# Patient Record
Sex: FEMALE | Race: ASIAN | Employment: FULL TIME | URBAN - METROPOLITAN AREA
[De-identification: names, ages, dates, MRNs, and addresses within clinical notes are randomized per-mention and may not be internally consistent; named-entity substitution may affect disease eponyms.]

---

## 2020-05-18 ENCOUNTER — OFFICE VISIT (OUTPATIENT)
Dept: INTERNAL MEDICINE CLINIC | Facility: CLINIC | Age: 55
End: 2020-05-18
Payer: COMMERCIAL

## 2020-05-18 VITALS
SYSTOLIC BLOOD PRESSURE: 150 MMHG | WEIGHT: 168 LBS | BODY MASS INDEX: 35.26 KG/M2 | OXYGEN SATURATION: 98 % | RESPIRATION RATE: 16 BRPM | DIASTOLIC BLOOD PRESSURE: 90 MMHG | HEIGHT: 58 IN | TEMPERATURE: 99 F | HEART RATE: 92 BPM

## 2020-05-18 DIAGNOSIS — R73.09 ELEVATED GLUCOSE: ICD-10-CM

## 2020-05-18 DIAGNOSIS — Z12.11 COLON CANCER SCREENING: ICD-10-CM

## 2020-05-18 DIAGNOSIS — Z13.29 THYROID DISORDER SCREEN: ICD-10-CM

## 2020-05-18 DIAGNOSIS — IMO0001 PAP SMEAR OF CERVIX REMINDER NOT NEEDED FOREVER: ICD-10-CM

## 2020-05-18 DIAGNOSIS — Z13.89 SCREENING FOR GENITOURINARY CONDITION: ICD-10-CM

## 2020-05-18 DIAGNOSIS — Z13.220 LIPID SCREENING: ICD-10-CM

## 2020-05-18 DIAGNOSIS — I10 ESSENTIAL HYPERTENSION: ICD-10-CM

## 2020-05-18 DIAGNOSIS — R07.89 ATYPICAL CHEST PAIN: ICD-10-CM

## 2020-05-18 DIAGNOSIS — Z12.31 ENCOUNTER FOR SCREENING MAMMOGRAM FOR MALIGNANT NEOPLASM OF BREAST: ICD-10-CM

## 2020-05-18 DIAGNOSIS — Z13.0 SCREENING, IRON DEFICIENCY ANEMIA: ICD-10-CM

## 2020-05-18 DIAGNOSIS — Z00.00 LABORATORY EXAMINATION ORDERED AS PART OF A ROUTINE GENERAL MEDICAL EXAMINATION: ICD-10-CM

## 2020-05-18 DIAGNOSIS — Z00.00 ANNUAL PHYSICAL EXAM: Primary | ICD-10-CM

## 2020-05-18 PROBLEM — F17.200 TOBACCO DEPENDENCE: Status: ACTIVE | Noted: 2020-05-18

## 2020-05-18 PROCEDURE — 99203 OFFICE O/P NEW LOW 30 MIN: CPT | Performed by: INTERNAL MEDICINE

## 2020-05-18 PROCEDURE — 93000 ELECTROCARDIOGRAM COMPLETE: CPT | Performed by: INTERNAL MEDICINE

## 2020-05-18 PROCEDURE — 99386 PREV VISIT NEW AGE 40-64: CPT | Performed by: INTERNAL MEDICINE

## 2020-05-18 RX ORDER — LOSARTAN POTASSIUM 50 MG/1
50 TABLET ORAL DAILY
Qty: 90 TABLET | Refills: 0 | Status: SHIPPED | OUTPATIENT
Start: 2020-05-18 | End: 2020-09-21

## 2020-05-18 RX ORDER — ASCORBIC ACID 500 MG
500 TABLET ORAL DAILY
COMMUNITY

## 2020-05-18 NOTE — PROGRESS NOTES
HPI:    Patient ID: Shruthi Cox is a 48year old female.     HPI  HPI:   Shruthi Cox is a 48year old female who presents for a complete physical exam. Symptoms: denies discharge, itching, burning or dysuria, is menopausal. Patient complains of el or double vision  HEENT: denies nasal congestion, sinus pain or ST  LUNGS: denies shortness of breath with exertion  CARDIOVASCULAR: denies chest pain on exertion  GI: denies abdominal pain,denies heartburn  : denies dysuria, vaginal discharge or itching understanding of these issues and agrees to the plan.   The patient is asked to return fin 2 weeks for BP check    Review of Systems         Current Outpatient Medications   Medication Sig Dispense Refill   • aspirin 81 MG Oral Tab Take 81 mg by mouth daily

## 2020-05-19 ENCOUNTER — TELEPHONE (OUTPATIENT)
Dept: INTERNAL MEDICINE CLINIC | Facility: CLINIC | Age: 55
End: 2020-05-19

## 2020-05-19 ENCOUNTER — HOSPITAL ENCOUNTER (OUTPATIENT)
Dept: MAMMOGRAPHY | Age: 55
Discharge: HOME | End: 2020-05-19
Attending: INTERNAL MEDICINE
Payer: COMMERCIAL

## 2020-05-19 ENCOUNTER — APPOINTMENT (OUTPATIENT)
Dept: LAB | Facility: HOSPITAL | Age: 55
End: 2020-05-19
Attending: INTERNAL MEDICINE
Payer: COMMERCIAL

## 2020-05-19 ENCOUNTER — LABORATORY ENCOUNTER (OUTPATIENT)
Dept: LAB | Age: 55
End: 2020-05-19
Attending: INTERNAL MEDICINE
Payer: COMMERCIAL

## 2020-05-19 DIAGNOSIS — Z13.0 SCREENING, IRON DEFICIENCY ANEMIA: ICD-10-CM

## 2020-05-19 DIAGNOSIS — Z13.29 THYROID DISORDER SCREEN: ICD-10-CM

## 2020-05-19 DIAGNOSIS — E78.2 ELEVATED TRIGLYCERIDES WITH HIGH CHOLESTEROL: ICD-10-CM

## 2020-05-19 DIAGNOSIS — Z12.31 ENCOUNTER FOR SCREENING MAMMOGRAM FOR MALIGNANT NEOPLASM OF BREAST: ICD-10-CM

## 2020-05-19 DIAGNOSIS — Z00.00 LABORATORY EXAMINATION ORDERED AS PART OF A ROUTINE GENERAL MEDICAL EXAMINATION: ICD-10-CM

## 2020-05-19 DIAGNOSIS — Z13.220 LIPID SCREENING: ICD-10-CM

## 2020-05-19 DIAGNOSIS — E78.2 ELEVATED TRIGLYCERIDES WITH HIGH CHOLESTEROL: Primary | ICD-10-CM

## 2020-05-19 PROCEDURE — 80050 GENERAL HEALTH PANEL: CPT | Performed by: INTERNAL MEDICINE

## 2020-05-19 PROCEDURE — 83721 ASSAY OF BLOOD LIPOPROTEIN: CPT | Performed by: INTERNAL MEDICINE

## 2020-05-19 PROCEDURE — 83690 ASSAY OF LIPASE: CPT | Performed by: INTERNAL MEDICINE

## 2020-05-19 PROCEDURE — 36415 COLL VENOUS BLD VENIPUNCTURE: CPT | Performed by: INTERNAL MEDICINE

## 2020-05-19 PROCEDURE — 77063 BREAST TOMOSYNTHESIS BI: CPT | Performed by: INTERNAL MEDICINE

## 2020-05-19 PROCEDURE — 77067 SCR MAMMO BI INCL CAD: CPT | Performed by: INTERNAL MEDICINE

## 2020-05-19 PROCEDURE — 83036 HEMOGLOBIN GLYCOSYLATED A1C: CPT | Performed by: INTERNAL MEDICINE

## 2020-05-19 PROCEDURE — 80061 LIPID PANEL: CPT | Performed by: INTERNAL MEDICINE

## 2020-05-19 PROCEDURE — 82150 ASSAY OF AMYLASE: CPT | Performed by: INTERNAL MEDICINE

## 2020-05-19 PROCEDURE — 84439 ASSAY OF FREE THYROXINE: CPT | Performed by: INTERNAL MEDICINE

## 2020-05-19 RX ORDER — LEVOTHYROXINE SODIUM 0.05 MG/1
50 TABLET ORAL
Qty: 30 TABLET | Refills: 0 | Status: SHIPPED | OUTPATIENT
Start: 2020-05-19 | End: 2020-06-15

## 2020-05-19 RX ORDER — GLIMEPIRIDE 2 MG/1
2 TABLET ORAL 2 TIMES DAILY WITH MEALS
Qty: 60 TABLET | Refills: 0 | Status: SHIPPED | OUTPATIENT
Start: 2020-05-19 | End: 2020-06-15

## 2020-05-19 RX ORDER — ICOSAPENT ETHYL 1000 MG/1
2 CAPSULE ORAL DAILY
Qty: 60 CAPSULE | Refills: 0 | Status: SHIPPED | OUTPATIENT
Start: 2020-05-19 | End: 2020-06-01

## 2020-05-19 RX ORDER — ROSUVASTATIN CALCIUM 20 MG/1
20 TABLET, COATED ORAL DAILY
Qty: 30 TABLET | Refills: 0 | Status: SHIPPED | OUTPATIENT
Start: 2020-05-19 | End: 2020-06-15

## 2020-05-19 NOTE — TELEPHONE ENCOUNTER
Spoke with patient and discussed recommendations, pharmacy confirmed, and understanding was expressed. Orders placed.

## 2020-05-19 NOTE — TELEPHONE ENCOUNTER
----- Message from Marilynn Sewell MD sent at 5/19/2020  4:06 PM CDT -----  Review at follow up    [address] [facility],[address] PT ON MEDS    AMARYL 2 MG BID WITH MEALS  FARXIGA 5 MG QAM  CRESTOR 20 MG DAILY  VESCEPA 2 GRAMS BID DUE TO ELEVATED TRIGS WHICH P

## 2020-05-21 ENCOUNTER — TELEPHONE (OUTPATIENT)
Dept: INTERNAL MEDICINE CLINIC | Facility: CLINIC | Age: 55
End: 2020-05-21

## 2020-05-21 ENCOUNTER — APPOINTMENT (OUTPATIENT)
Dept: LAB | Age: 55
End: 2020-05-21
Attending: INTERNAL MEDICINE
Payer: COMMERCIAL

## 2020-05-21 DIAGNOSIS — Z12.11 COLON CANCER SCREENING: ICD-10-CM

## 2020-05-21 DIAGNOSIS — Z13.89 SCREENING FOR GENITOURINARY CONDITION: ICD-10-CM

## 2020-05-21 DIAGNOSIS — R73.09 ELEVATED GLUCOSE: ICD-10-CM

## 2020-05-21 PROCEDURE — 82274 ASSAY TEST FOR BLOOD FECAL: CPT | Performed by: INTERNAL MEDICINE

## 2020-05-21 PROCEDURE — 82570 ASSAY OF URINE CREATININE: CPT | Performed by: INTERNAL MEDICINE

## 2020-05-21 PROCEDURE — 81001 URINALYSIS AUTO W/SCOPE: CPT | Performed by: INTERNAL MEDICINE

## 2020-05-21 PROCEDURE — 82043 UR ALBUMIN QUANTITATIVE: CPT | Performed by: INTERNAL MEDICINE

## 2020-05-22 NOTE — TELEPHONE ENCOUNTER
Appeal form filled out with supporting documentation. Chart notes and lab results provided.   Forms faxed  Awaiting decision

## 2020-05-29 PROBLEM — E78.1 HYPERTRIGLYCERIDEMIA: Status: ACTIVE | Noted: 2020-05-29

## 2020-06-01 ENCOUNTER — TELEPHONE (OUTPATIENT)
Dept: INTERNAL MEDICINE CLINIC | Facility: CLINIC | Age: 55
End: 2020-06-01

## 2020-06-01 ENCOUNTER — OFFICE VISIT (OUTPATIENT)
Dept: INTERNAL MEDICINE CLINIC | Facility: CLINIC | Age: 55
End: 2020-06-01
Payer: COMMERCIAL

## 2020-06-01 VITALS
WEIGHT: 166 LBS | DIASTOLIC BLOOD PRESSURE: 80 MMHG | HEART RATE: 84 BPM | RESPIRATION RATE: 16 BRPM | TEMPERATURE: 99 F | OXYGEN SATURATION: 97 % | SYSTOLIC BLOOD PRESSURE: 150 MMHG | HEIGHT: 58 IN | BODY MASS INDEX: 34.85 KG/M2

## 2020-06-01 DIAGNOSIS — F17.200 TOBACCO DEPENDENCE: ICD-10-CM

## 2020-06-01 DIAGNOSIS — I10 ESSENTIAL HYPERTENSION: ICD-10-CM

## 2020-06-01 DIAGNOSIS — E78.1 HYPERTRIGLYCERIDEMIA: ICD-10-CM

## 2020-06-01 DIAGNOSIS — E11.29 DIABETES MELLITUS WITH PROTEINURIA (HCC): Primary | ICD-10-CM

## 2020-06-01 DIAGNOSIS — R80.9 DIABETES MELLITUS WITH PROTEINURIA (HCC): Primary | ICD-10-CM

## 2020-06-01 PROCEDURE — 99214 OFFICE O/P EST MOD 30 MIN: CPT | Performed by: INTERNAL MEDICINE

## 2020-06-01 RX ORDER — GEMFIBROZIL 600 MG/1
600 TABLET, FILM COATED ORAL
Qty: 180 TABLET | Refills: 1 | Status: SHIPPED | OUTPATIENT
Start: 2020-06-01 | End: 2020-12-28

## 2020-06-01 RX ORDER — AMLODIPINE BESYLATE 5 MG/1
5 TABLET ORAL DAILY
Qty: 90 TABLET | Refills: 0 | Status: SHIPPED | OUTPATIENT
Start: 2020-06-01 | End: 2020-09-02

## 2020-06-01 NOTE — TELEPHONE ENCOUNTER
[facility] pharmacy called to make sure Ximena Kearney was aware of the possible drug interaction between gemfibrozil (given today) and rosuvastatin (given 5/19). Please call pharmacy back at #[phone].

## 2020-06-01 NOTE — TELEPHONE ENCOUNTER
rosuvastatin + gemfibrozil   avoid combo: combo may incr.  rosuvastatin levels, risk of myopathy, rhabdomyolysis, other adverse effects w/o additional cardiovascular benefit (OATP-mediated transport inhibited, additive effects)     Dr. Barbara Howard is aware ok to d

## 2020-06-01 NOTE — PROGRESS NOTES
HPI:    Patient ID: Monie Schuler is a 48year old female.     HTN       HPI:   Monie Schuler is a 48year old female who presents for recheck of her diabetes, htn and hyperlipidmeia Patient’s FBS have been improved since start of metformin and fa Propanediol (FARXIGA) 5 MG Oral Tab Take 5 mg by mouth every morning. 30 tablet 0   • Rosuvastatin Calcium 20 MG Oral Tab Take 1 tablet (20 mg total) by mouth daily.  30 tablet 0   • Levothyroxine Sodium 50 MCG Oral Tab Take 1 tablet (50 mcg total) by mouth Add norvasc 5 mg daily and recheck bp in 2 weeks  hyperlipidemia cont statin. Add gemfibozil.  Pt asked to monitor for myalgias    Recommendations are: continue present meds, check HgbA1C in 3 m, check fasting lipids and CMP in 3 m, lose wgt with carbohydra (600 mg total) by mouth 2 (two) times daily before meals.        Imaging & Referrals:  None       [id number]

## 2020-06-09 ENCOUNTER — MED REC SCAN ONLY (OUTPATIENT)
Dept: INTERNAL MEDICINE CLINIC | Facility: CLINIC | Age: 55
End: 2020-06-09

## 2020-06-15 ENCOUNTER — OFFICE VISIT (OUTPATIENT)
Dept: INTERNAL MEDICINE CLINIC | Facility: CLINIC | Age: 55
End: 2020-06-15
Payer: COMMERCIAL

## 2020-06-15 VITALS
HEART RATE: 90 BPM | OXYGEN SATURATION: 97 % | HEIGHT: 58 IN | DIASTOLIC BLOOD PRESSURE: 68 MMHG | TEMPERATURE: 98 F | RESPIRATION RATE: 16 BRPM | SYSTOLIC BLOOD PRESSURE: 116 MMHG | BODY MASS INDEX: 35.26 KG/M2 | WEIGHT: 168 LBS

## 2020-06-15 DIAGNOSIS — E03.4 HYPOTHYROIDISM DUE TO ACQUIRED ATROPHY OF THYROID: ICD-10-CM

## 2020-06-15 DIAGNOSIS — R80.9 DIABETES MELLITUS WITH PROTEINURIA (HCC): Primary | ICD-10-CM

## 2020-06-15 DIAGNOSIS — E11.29 DIABETES MELLITUS WITH PROTEINURIA (HCC): Primary | ICD-10-CM

## 2020-06-15 DIAGNOSIS — I10 ESSENTIAL HYPERTENSION: ICD-10-CM

## 2020-06-15 DIAGNOSIS — I10 ESSENTIAL HYPERTENSION: Primary | ICD-10-CM

## 2020-06-15 DIAGNOSIS — E78.1 HYPERTRIGLYCERIDEMIA: ICD-10-CM

## 2020-06-15 PROCEDURE — 99213 OFFICE O/P EST LOW 20 MIN: CPT | Performed by: INTERNAL MEDICINE

## 2020-06-15 RX ORDER — ROSUVASTATIN CALCIUM 20 MG/1
20 TABLET, COATED ORAL DAILY
Qty: 30 TABLET | Refills: 5 | Status: SHIPPED | OUTPATIENT
Start: 2020-06-15 | End: 2021-02-05

## 2020-06-15 RX ORDER — GLIMEPIRIDE 2 MG/1
2 TABLET ORAL 2 TIMES DAILY WITH MEALS
Qty: 60 TABLET | Refills: 2 | Status: SHIPPED | OUTPATIENT
Start: 2020-06-15 | End: 2020-08-04

## 2020-06-15 RX ORDER — LEVOTHYROXINE SODIUM 0.05 MG/1
50 TABLET ORAL
Qty: 30 TABLET | Refills: 0 | Status: SHIPPED | OUTPATIENT
Start: 2020-06-15 | End: 2020-07-14

## 2020-06-15 NOTE — TELEPHONE ENCOUNTER
Patient called and requested refills on Farxiga, Synthroid, Rosuvastatin, Glimepiride. Please send all a month refill to [facility] on file.

## 2020-06-15 NOTE — PROGRESS NOTES
HPI:    Patient ID: Kevin Land is a 48year old female. HPI  Kevin Land is a 48year old female. HPI:   Patient presents for recheck of her hypertension.  Pt has been taking medications as instructed, no medication side effects, home mouth daily. • losartan Potassium 50 MG Oral Tab Take 1 tablet (50 mg total) by mouth daily. 90 tablet 0      No past medical history on file. No past surgical history on file.    Social History:    Social History    Tobacco Use      Smoking status: L 0   • Dapagliflozin Propanediol (FARXIGA) 5 MG Oral Tab Take 5 mg by mouth every morning. 30 tablet 0   • Rosuvastatin Calcium 20 MG Oral Tab Take 1 tablet (20 mg total) by mouth daily.  30 tablet 0   • Levothyroxine Sodium 50 MCG Oral Tab Take 1 tablet (50

## 2020-07-14 DIAGNOSIS — E03.4 HYPOTHYROIDISM DUE TO ACQUIRED ATROPHY OF THYROID: ICD-10-CM

## 2020-07-14 RX ORDER — LEVOTHYROXINE SODIUM 0.05 MG/1
TABLET ORAL
Qty: 30 TABLET | Refills: 0 | Status: SHIPPED | OUTPATIENT
Start: 2020-07-14 | End: 2020-08-24

## 2020-08-03 ENCOUNTER — OFFICE VISIT (OUTPATIENT)
Dept: INTERNAL MEDICINE CLINIC | Facility: CLINIC | Age: 55
End: 2020-08-03
Payer: COMMERCIAL

## 2020-08-03 VITALS
HEART RATE: 82 BPM | HEIGHT: 58 IN | BODY MASS INDEX: 35.05 KG/M2 | RESPIRATION RATE: 16 BRPM | DIASTOLIC BLOOD PRESSURE: 68 MMHG | TEMPERATURE: 98 F | SYSTOLIC BLOOD PRESSURE: 128 MMHG | OXYGEN SATURATION: 98 % | WEIGHT: 167 LBS

## 2020-08-03 DIAGNOSIS — I10 ESSENTIAL HYPERTENSION: ICD-10-CM

## 2020-08-03 DIAGNOSIS — E11.29 DIABETES MELLITUS WITH PROTEINURIA (HCC): Primary | ICD-10-CM

## 2020-08-03 DIAGNOSIS — R80.9 DIABETES MELLITUS WITH PROTEINURIA (HCC): Primary | ICD-10-CM

## 2020-08-03 PROBLEM — Z23: Status: ACTIVE | Noted: 2020-08-03

## 2020-08-03 PROBLEM — Z28.21 PNEUMOCOCCAL VACCINATION DECLINED BY PATIENT: Status: ACTIVE | Noted: 2020-08-03

## 2020-08-03 PROBLEM — Z23: Status: RESOLVED | Noted: 2020-08-03 | Resolved: 2020-08-03

## 2020-08-03 PROCEDURE — 3074F SYST BP LT 130 MM HG: CPT | Performed by: INTERNAL MEDICINE

## 2020-08-03 PROCEDURE — 99214 OFFICE O/P EST MOD 30 MIN: CPT | Performed by: INTERNAL MEDICINE

## 2020-08-03 PROCEDURE — 3078F DIAST BP <80 MM HG: CPT | Performed by: INTERNAL MEDICINE

## 2020-08-03 PROCEDURE — 3008F BODY MASS INDEX DOCD: CPT | Performed by: INTERNAL MEDICINE

## 2020-08-03 NOTE — PROGRESS NOTES
HPI:    Patient ID: Hanna Walker is a 48year old female. Hypertension     Diabetes       HPI:   Hanna Walker is a 48year old female who presents for recheck of her diabetes and hypertension. Patient’s FBS have been improved.  No hypoglycemi times daily with meals. 60 tablet 2   • amLODIPine Besylate 5 MG Oral Tab Take 1 tablet (5 mg total) by mouth daily. 90 tablet 0   • gemfibrozil 600 MG Oral Tab Take 1 tablet (600 mg total) by mouth 2 (two) times daily before meals.  180 tablet 1   • aspiri plan  Pt declines pneumovax. Recommendations are: continue present meds, check HgbA1C, check fasting lipids and CMP, lose wgt with carbohydrate controlled diet and exercise, check feet daily.   The patient indicates understanding of these issues and agrees

## 2020-08-03 NOTE — PATIENT INSTRUCTIONS
Diabetes: Activity Tips    Being more active can help you manage your diabetes. The tips on this sheet can help you get the most from your exercise. They can also help you stay safe.    Staying active   It’s important for adults to spend less time sitting You may be told to plan your exercise for 1 to 2 hours after a meal. In most cases, you don’t need to eat while being active. Test your blood sugar before exercising if you take insulin or medicine that can cause low blood sugar.  And carry a fast-acting mensah

## 2020-08-04 ENCOUNTER — TELEPHONE (OUTPATIENT)
Dept: INTERNAL MEDICINE CLINIC | Facility: CLINIC | Age: 55
End: 2020-08-04

## 2020-08-04 ENCOUNTER — APPOINTMENT (OUTPATIENT)
Dept: LAB | Age: 55
End: 2020-08-04
Attending: INTERNAL MEDICINE
Payer: COMMERCIAL

## 2020-08-04 DIAGNOSIS — R80.9 DIABETES MELLITUS WITH PROTEINURIA (HCC): ICD-10-CM

## 2020-08-04 DIAGNOSIS — E11.29 DIABETES MELLITUS WITH PROTEINURIA (HCC): ICD-10-CM

## 2020-08-04 DIAGNOSIS — I10 ESSENTIAL HYPERTENSION: ICD-10-CM

## 2020-08-04 LAB
ALBUMIN SERPL-MCNC: 3.9 G/DL (ref 3.4–5)
ALBUMIN/GLOB SERPL: 1 {RATIO} (ref 1–2)
ALP LIVER SERPL-CCNC: 69 U/L (ref 41–108)
ALT SERPL-CCNC: 57 U/L (ref 13–56)
ANION GAP SERPL CALC-SCNC: 4 MMOL/L (ref 0–18)
AST SERPL-CCNC: 23 U/L (ref 15–37)
BILIRUB SERPL-MCNC: 0.3 MG/DL (ref 0.1–2)
BUN BLD-MCNC: 16 MG/DL (ref 7–18)
BUN/CREAT SERPL: 17.6 (ref 10–20)
CALCIUM BLD-MCNC: 9.4 MG/DL (ref 8.5–10.1)
CHLORIDE SERPL-SCNC: 108 MMOL/L (ref 98–112)
CHOLEST SMN-MCNC: 191 MG/DL (ref ?–200)
CO2 SERPL-SCNC: 27 MMOL/L (ref 21–32)
CREAT BLD-MCNC: 0.91 MG/DL (ref 0.55–1.02)
EST. AVERAGE GLUCOSE BLD GHB EST-MCNC: 212 MG/DL (ref 68–126)
GLOBULIN PLAS-MCNC: 3.9 G/DL (ref 2.8–4.4)
GLUCOSE BLD-MCNC: 152 MG/DL (ref 70–99)
HBA1C MFR BLD HPLC: 9 % (ref ?–5.7)
HDLC SERPL-MCNC: 44 MG/DL (ref 40–59)
LDLC SERPL CALC-MCNC: 101 MG/DL (ref ?–100)
M PROTEIN MFR SERPL ELPH: 7.8 G/DL (ref 6.4–8.2)
NONHDLC SERPL-MCNC: 147 MG/DL (ref ?–130)
OSMOLALITY SERPL CALC.SUM OF ELEC: 292 MOSM/KG (ref 275–295)
PATIENT FASTING Y/N/NP: YES
PATIENT FASTING Y/N/NP: YES
POTASSIUM SERPL-SCNC: 4 MMOL/L (ref 3.5–5.1)
SODIUM SERPL-SCNC: 139 MMOL/L (ref 136–145)
TRIGL SERPL-MCNC: 232 MG/DL (ref 30–149)
VLDLC SERPL CALC-MCNC: 46 MG/DL (ref 0–30)

## 2020-08-04 PROCEDURE — 80061 LIPID PANEL: CPT | Performed by: INTERNAL MEDICINE

## 2020-08-04 PROCEDURE — 36415 COLL VENOUS BLD VENIPUNCTURE: CPT | Performed by: INTERNAL MEDICINE

## 2020-08-04 PROCEDURE — 80053 COMPREHEN METABOLIC PANEL: CPT | Performed by: INTERNAL MEDICINE

## 2020-08-04 PROCEDURE — 83036 HEMOGLOBIN GLYCOSYLATED A1C: CPT | Performed by: INTERNAL MEDICINE

## 2020-08-04 RX ORDER — GLIMEPIRIDE 2 MG/1
2 TABLET ORAL 2 TIMES DAILY WITH MEALS
Qty: 60 TABLET | Refills: 2 | Status: SHIPPED | OUTPATIENT
Start: 2020-08-04 | End: 2021-01-26

## 2020-08-04 NOTE — TELEPHONE ENCOUNTER
----- Message from Guillermina Pena MD sent at 8/4/2020  3:31 PM CDT -----  Cont statin.  flp is at goal. Cont treatment for elevated trigs  Renal lft are normal

## 2020-08-04 NOTE — TELEPHONE ENCOUNTER
----- Message from Shane Mayo MD sent at 8/4/2020  4:32 PM CDT -----  dm2 improved but not at goal. Increase farxiga to 10 mg daily and recheck a1c in 3 m

## 2020-08-23 DIAGNOSIS — E03.4 HYPOTHYROIDISM DUE TO ACQUIRED ATROPHY OF THYROID: ICD-10-CM

## 2020-08-24 RX ORDER — LEVOTHYROXINE SODIUM 0.05 MG/1
TABLET ORAL
Qty: 30 TABLET | Refills: 0 | Status: SHIPPED | OUTPATIENT
Start: 2020-08-24 | End: 2020-09-21

## 2020-09-02 DIAGNOSIS — I10 ESSENTIAL HYPERTENSION: ICD-10-CM

## 2020-09-02 RX ORDER — AMLODIPINE BESYLATE 5 MG/1
TABLET ORAL
Qty: 90 TABLET | Refills: 1 | Status: SHIPPED | OUTPATIENT
Start: 2020-09-02 | End: 2021-08-03

## 2020-09-20 DIAGNOSIS — E03.4 HYPOTHYROIDISM DUE TO ACQUIRED ATROPHY OF THYROID: ICD-10-CM

## 2020-09-20 DIAGNOSIS — I10 ESSENTIAL HYPERTENSION: ICD-10-CM

## 2020-09-21 RX ORDER — LEVOTHYROXINE SODIUM 0.05 MG/1
TABLET ORAL
Qty: 30 TABLET | Refills: 0 | Status: SHIPPED | OUTPATIENT
Start: 2020-09-21 | End: 2020-11-01

## 2020-09-21 RX ORDER — LOSARTAN POTASSIUM 50 MG/1
TABLET ORAL
Qty: 90 TABLET | Refills: 0 | Status: SHIPPED | OUTPATIENT
Start: 2020-09-21 | End: 2020-12-28

## 2020-11-01 DIAGNOSIS — E03.4 HYPOTHYROIDISM DUE TO ACQUIRED ATROPHY OF THYROID: ICD-10-CM

## 2020-11-02 RX ORDER — LEVOTHYROXINE SODIUM 0.05 MG/1
TABLET ORAL
Qty: 30 TABLET | Refills: 0 | Status: SHIPPED | OUTPATIENT
Start: 2020-11-02 | End: 2020-12-09

## 2020-12-09 DIAGNOSIS — E03.4 HYPOTHYROIDISM DUE TO ACQUIRED ATROPHY OF THYROID: ICD-10-CM

## 2020-12-09 RX ORDER — LEVOTHYROXINE SODIUM 0.05 MG/1
TABLET ORAL
Qty: 30 TABLET | Refills: 5 | Status: SHIPPED | OUTPATIENT
Start: 2020-12-09 | End: 2021-08-03

## 2020-12-11 ENCOUNTER — OFFICE VISIT (OUTPATIENT)
Dept: INTERNAL MEDICINE CLINIC | Facility: CLINIC | Age: 55
End: 2020-12-11
Payer: COMMERCIAL

## 2020-12-11 VITALS
SYSTOLIC BLOOD PRESSURE: 138 MMHG | BODY MASS INDEX: 34.63 KG/M2 | OXYGEN SATURATION: 97 % | RESPIRATION RATE: 16 BRPM | TEMPERATURE: 97 F | HEART RATE: 88 BPM | HEIGHT: 58 IN | DIASTOLIC BLOOD PRESSURE: 82 MMHG | WEIGHT: 165 LBS

## 2020-12-11 DIAGNOSIS — F17.200 TOBACCO DEPENDENCE: ICD-10-CM

## 2020-12-11 DIAGNOSIS — R80.9 DIABETES MELLITUS WITH PROTEINURIA (HCC): Primary | ICD-10-CM

## 2020-12-11 DIAGNOSIS — Z28.21 PNEUMOCOCCAL VACCINATION DECLINED BY PATIENT: ICD-10-CM

## 2020-12-11 DIAGNOSIS — Z01.84 IMMUNITY STATUS TESTING: ICD-10-CM

## 2020-12-11 DIAGNOSIS — Z23 NEED FOR INFLUENZA VACCINATION: ICD-10-CM

## 2020-12-11 DIAGNOSIS — I10 ESSENTIAL HYPERTENSION: ICD-10-CM

## 2020-12-11 DIAGNOSIS — E11.29 DIABETES MELLITUS WITH PROTEINURIA (HCC): Primary | ICD-10-CM

## 2020-12-11 PROCEDURE — 3079F DIAST BP 80-89 MM HG: CPT | Performed by: INTERNAL MEDICINE

## 2020-12-11 PROCEDURE — 99214 OFFICE O/P EST MOD 30 MIN: CPT | Performed by: INTERNAL MEDICINE

## 2020-12-11 PROCEDURE — 3008F BODY MASS INDEX DOCD: CPT | Performed by: INTERNAL MEDICINE

## 2020-12-11 PROCEDURE — 90471 IMMUNIZATION ADMIN: CPT | Performed by: INTERNAL MEDICINE

## 2020-12-11 PROCEDURE — 3075F SYST BP GE 130 - 139MM HG: CPT | Performed by: INTERNAL MEDICINE

## 2020-12-11 PROCEDURE — 90686 IIV4 VACC NO PRSV 0.5 ML IM: CPT | Performed by: INTERNAL MEDICINE

## 2020-12-11 RX ORDER — DAPAGLIFLOZIN 5 MG/1
5 TABLET, FILM COATED ORAL EVERY MORNING
COMMUNITY
Start: 2020-11-22 | End: 2020-12-28

## 2020-12-11 NOTE — PROGRESS NOTES
HPI:    Patient ID: Suly Colunga is a 48year old female. Diabetes    Hypertension      HPI:   Suly Colunga is a 48year old female who presents for recheck of her diabetes and ht. Patient’s FBS have been 120's. No hypoglycemia. .  Pt has bee TABLET(50 MG) BY MOUTH DAILY 90 tablet 0   • AMLODIPINE BESYLATE 5 MG Oral Tab TAKE 1 TABLET(5 MG) BY MOUTH DAILY 90 tablet 1   • Rosuvastatin Calcium 20 MG Oral Tab Take 1 tablet (20 mg total) by mouth daily.  30 tablet 5   • gemfibrozil 600 MG Oral Tab Ta well.      ASSESSMENT AND PLAN:   Randa Vega is a 48year old female who presents for a recheck of her diabetes and htn. Diabetic control is stable.  HTN is at goal.  Recommendations are: at this time continue present meds, check HgbA1C,  fasting li prescriptions requested or ordered in this encounter       Imaging & Referrals:  FLULAVAL INFLUENZA VACCINE QUAD PRESERVATIVE FREE 0.5 ML       VI#[id number]

## 2020-12-27 DIAGNOSIS — I10 ESSENTIAL HYPERTENSION: ICD-10-CM

## 2020-12-27 DIAGNOSIS — E78.1 HYPERTRIGLYCERIDEMIA: ICD-10-CM

## 2020-12-28 RX ORDER — GEMFIBROZIL 600 MG/1
TABLET, FILM COATED ORAL
Qty: 180 TABLET | Refills: 1 | Status: SHIPPED | OUTPATIENT
Start: 2020-12-28 | End: 2021-09-17

## 2020-12-28 RX ORDER — LOSARTAN POTASSIUM 50 MG/1
TABLET ORAL
Qty: 90 TABLET | Refills: 0 | Status: SHIPPED | OUTPATIENT
Start: 2020-12-28 | End: 2021-05-14

## 2020-12-28 RX ORDER — DAPAGLIFLOZIN 5 MG/1
TABLET, FILM COATED ORAL
Qty: 30 TABLET | Refills: 0 | Status: SHIPPED | OUTPATIENT
Start: 2020-12-28 | End: 2021-01-26

## 2020-12-28 NOTE — TELEPHONE ENCOUNTER
Last time medication was refilled 6/1/2020  Quantity and number of refills 180 w/  1  Last OV 12/11/2020  Next OV 1/5/2021     Losartan  Last time medication was refilled 9/21/2020  Quantity and number of refills 90 w/ 0   Last OV 12/11/2020  Next OV 1/5/2

## 2020-12-30 ENCOUNTER — LAB ENCOUNTER (OUTPATIENT)
Dept: LAB | Age: 55
End: 2020-12-30
Attending: INTERNAL MEDICINE
Payer: COMMERCIAL

## 2020-12-30 DIAGNOSIS — I10 ESSENTIAL HYPERTENSION: ICD-10-CM

## 2020-12-30 DIAGNOSIS — Z01.84 IMMUNITY STATUS TESTING: ICD-10-CM

## 2020-12-30 DIAGNOSIS — E11.29 DIABETES MELLITUS WITH PROTEINURIA (HCC): ICD-10-CM

## 2020-12-30 DIAGNOSIS — R80.9 DIABETES MELLITUS WITH PROTEINURIA (HCC): ICD-10-CM

## 2020-12-30 LAB
ALBUMIN SERPL-MCNC: 3.8 G/DL (ref 3.4–5)
ALBUMIN/GLOB SERPL: 1.1 {RATIO} (ref 1–2)
ALP LIVER SERPL-CCNC: 89 U/L
ALT SERPL-CCNC: 24 U/L
ANION GAP SERPL CALC-SCNC: 6 MMOL/L (ref 0–18)
AST SERPL-CCNC: 18 U/L (ref 15–37)
BILIRUB SERPL-MCNC: 0.3 MG/DL (ref 0.1–2)
BUN BLD-MCNC: 15 MG/DL (ref 7–18)
BUN/CREAT SERPL: 18.8 (ref 10–20)
CALCIUM BLD-MCNC: 9.1 MG/DL (ref 8.5–10.1)
CHLORIDE SERPL-SCNC: 111 MMOL/L (ref 98–112)
CHOLEST SMN-MCNC: 202 MG/DL (ref ?–200)
CO2 SERPL-SCNC: 24 MMOL/L (ref 21–32)
CREAT BLD-MCNC: 0.8 MG/DL
EST. AVERAGE GLUCOSE BLD GHB EST-MCNC: 166 MG/DL (ref 68–126)
GLOBULIN PLAS-MCNC: 3.6 G/DL (ref 2.8–4.4)
GLUCOSE BLD-MCNC: 138 MG/DL (ref 70–99)
HBA1C MFR BLD HPLC: 7.4 % (ref ?–5.7)
HDLC SERPL-MCNC: 44 MG/DL (ref 40–59)
LDLC SERPL CALC-MCNC: 101 MG/DL (ref ?–100)
M PROTEIN MFR SERPL ELPH: 7.4 G/DL (ref 6.4–8.2)
NONHDLC SERPL-MCNC: 158 MG/DL (ref ?–130)
OSMOLALITY SERPL CALC.SUM OF ELEC: 295 MOSM/KG (ref 275–295)
PATIENT FASTING Y/N/NP: YES
PATIENT FASTING Y/N/NP: YES
POTASSIUM SERPL-SCNC: 4 MMOL/L (ref 3.5–5.1)
SODIUM SERPL-SCNC: 141 MMOL/L (ref 136–145)
TRIGL SERPL-MCNC: 286 MG/DL (ref 30–149)
VLDLC SERPL CALC-MCNC: 57 MG/DL (ref 0–30)

## 2020-12-30 PROCEDURE — 86480 TB TEST CELL IMMUN MEASURE: CPT

## 2020-12-30 PROCEDURE — 80061 LIPID PANEL: CPT

## 2020-12-30 PROCEDURE — 80053 COMPREHEN METABOLIC PANEL: CPT

## 2020-12-30 PROCEDURE — 83036 HEMOGLOBIN GLYCOSYLATED A1C: CPT

## 2020-12-30 PROCEDURE — 36415 COLL VENOUS BLD VENIPUNCTURE: CPT

## 2021-01-02 LAB
M TB IFN-G CD4+ T-CELLS BLD-ACNC: 0.04 IU/ML
M TB TUBERC IFN-G BLD QL: NEGATIVE
M TB TUBERC IGNF/MITOGEN IGNF CONTROL: 7.35 IU/ML
QUANTIFERON TB1 MINUS NIL: 0.02 IU/ML
QUANTIFERON TB2 MINUS NIL: 0 IU/ML

## 2021-01-05 ENCOUNTER — LAB ENCOUNTER (OUTPATIENT)
Dept: LAB | Age: 56
End: 2021-01-05
Attending: INTERNAL MEDICINE
Payer: COMMERCIAL

## 2021-01-05 ENCOUNTER — OFFICE VISIT (OUTPATIENT)
Dept: INTERNAL MEDICINE CLINIC | Facility: CLINIC | Age: 56
End: 2021-01-05
Payer: COMMERCIAL

## 2021-01-05 VITALS
OXYGEN SATURATION: 98 % | BODY MASS INDEX: 34.43 KG/M2 | HEART RATE: 83 BPM | WEIGHT: 164 LBS | DIASTOLIC BLOOD PRESSURE: 80 MMHG | TEMPERATURE: 98 F | RESPIRATION RATE: 16 BRPM | HEIGHT: 58 IN | SYSTOLIC BLOOD PRESSURE: 124 MMHG

## 2021-01-05 DIAGNOSIS — Z01.84 IMMUNITY STATUS TESTING: ICD-10-CM

## 2021-01-05 DIAGNOSIS — M79.604 PAIN IN BOTH LOWER EXTREMITIES: Primary | ICD-10-CM

## 2021-01-05 DIAGNOSIS — M79.605 PAIN IN BOTH LOWER EXTREMITIES: Primary | ICD-10-CM

## 2021-01-05 DIAGNOSIS — Z23 NEED FOR TDAP VACCINATION: ICD-10-CM

## 2021-01-05 DIAGNOSIS — G57.93 NEUROPATHY INVOLVING BOTH LOWER EXTREMITIES: ICD-10-CM

## 2021-01-05 LAB
RUBV IGG SER QL: POSITIVE
RUBV IGG SER-ACNC: 71.7 IU/ML (ref 10–?)

## 2021-01-05 PROCEDURE — 90715 TDAP VACCINE 7 YRS/> IM: CPT | Performed by: INTERNAL MEDICINE

## 2021-01-05 PROCEDURE — 3008F BODY MASS INDEX DOCD: CPT | Performed by: INTERNAL MEDICINE

## 2021-01-05 PROCEDURE — 86765 RUBEOLA ANTIBODY: CPT

## 2021-01-05 PROCEDURE — 36415 COLL VENOUS BLD VENIPUNCTURE: CPT

## 2021-01-05 PROCEDURE — 90471 IMMUNIZATION ADMIN: CPT | Performed by: INTERNAL MEDICINE

## 2021-01-05 PROCEDURE — 86735 MUMPS ANTIBODY: CPT

## 2021-01-05 PROCEDURE — 3074F SYST BP LT 130 MM HG: CPT | Performed by: INTERNAL MEDICINE

## 2021-01-05 PROCEDURE — 99214 OFFICE O/P EST MOD 30 MIN: CPT | Performed by: INTERNAL MEDICINE

## 2021-01-05 PROCEDURE — 86762 RUBELLA ANTIBODY: CPT

## 2021-01-05 PROCEDURE — 3079F DIAST BP 80-89 MM HG: CPT | Performed by: INTERNAL MEDICINE

## 2021-01-05 RX ORDER — GABAPENTIN 100 MG/1
100 CAPSULE ORAL 2 TIMES DAILY
Qty: 180 CAPSULE | Refills: 0 | Status: SHIPPED | OUTPATIENT
Start: 2021-01-05 | End: 2021-05-14

## 2021-01-05 NOTE — PROGRESS NOTES
HPI:    Patient ID: Geovanna Contreras is a 48year old female. Leg Pain   The pain is present in the left lower leg and right lower leg. This is a recurrent problem. The current episode started more than 1 month ago.  There has been no history of extrem • Vitamin C 500 MG Oral Tab Take 500 mg by mouth daily. • glimepiride 2 MG Oral Tab Take 1 tablet (2 mg total) by mouth 2 (two) times daily with meals.  60 tablet 2     Allergies:  Metformin               DIARRHEA   PHYSICAL EXAM:   Physical Exam   Co

## 2021-01-05 NOTE — PATIENT INSTRUCTIONS
Tips for Quitting Smoking (Cardiovascular)  Quitting smoking is a gift to yourself. It's one of the best things you can do to keep your heart disease from getting worse. Smoking reduces oxygen flow to your heart. It does this in 2 ways.  It speeds the pfeiffer ______________________________________________________________________________  ______________________________________________________________________________  Set limits  · Limit where you can smoke. Pick one room or a porch. Smoke only in that place.   ·

## 2021-01-06 LAB
MEV IGG SER-ACNC: 275 AU/ML (ref 16.5–?)
MUV IGG SER IA-ACNC: 29.8 AU/ML (ref 11–?)

## 2021-01-26 DIAGNOSIS — E11.29 DIABETES MELLITUS WITH PROTEINURIA (HCC): ICD-10-CM

## 2021-01-26 DIAGNOSIS — R80.9 DIABETES MELLITUS WITH PROTEINURIA (HCC): ICD-10-CM

## 2021-01-26 RX ORDER — BLOOD SUGAR DIAGNOSTIC
STRIP MISCELLANEOUS
Qty: 200 EACH | Refills: 1 | Status: SHIPPED | OUTPATIENT
Start: 2021-01-26

## 2021-01-26 RX ORDER — GLIMEPIRIDE 2 MG/1
2 TABLET ORAL 2 TIMES DAILY WITH MEALS
Qty: 60 TABLET | Refills: 5 | Status: SHIPPED | OUTPATIENT
Start: 2021-01-26 | End: 2021-11-30

## 2021-01-26 NOTE — TELEPHONE ENCOUNTER
Fax request from [facility] for a refill on Glimeperide 2mg tabs qty 60, Farxiga 5 mg qty 30, and Contour Next Test Strips 50s qty 200 . Fax in triage.

## 2021-01-27 DIAGNOSIS — E11.29 DIABETES MELLITUS WITH PROTEINURIA (HCC): Primary | ICD-10-CM

## 2021-01-27 DIAGNOSIS — R80.9 DIABETES MELLITUS WITH PROTEINURIA (HCC): Primary | ICD-10-CM

## 2021-01-27 RX ORDER — LANCETS
EACH MISCELLANEOUS
Qty: 200 EACH | Refills: 1 | Status: SHIPPED | OUTPATIENT
Start: 2021-01-27

## 2021-01-27 NOTE — TELEPHONE ENCOUNTER
Refill Request    [facility] [address] AT [address] [facility], [phone], [phone]    Put in Triage room

## 2021-02-05 DIAGNOSIS — R80.9 DIABETES MELLITUS WITH PROTEINURIA (HCC): ICD-10-CM

## 2021-02-05 DIAGNOSIS — E78.1 HYPERTRIGLYCERIDEMIA: ICD-10-CM

## 2021-02-05 DIAGNOSIS — E11.29 DIABETES MELLITUS WITH PROTEINURIA (HCC): ICD-10-CM

## 2021-02-05 RX ORDER — ROSUVASTATIN CALCIUM 20 MG/1
20 TABLET, COATED ORAL DAILY
Qty: 30 TABLET | Refills: 5 | Status: SHIPPED | OUTPATIENT
Start: 2021-02-05

## 2021-02-05 NOTE — TELEPHONE ENCOUNTER
Fax received from [facility] requesting refill of Rosuvastatin Calcium 20 MG Oral Tab. Placed in triage.

## 2021-03-17 DIAGNOSIS — Z23 NEED FOR VACCINATION: ICD-10-CM

## 2021-04-03 DIAGNOSIS — E03.4 HYPOTHYROIDISM DUE TO ACQUIRED ATROPHY OF THYROID: ICD-10-CM

## 2021-04-05 RX ORDER — LEVOTHYROXINE SODIUM 0.05 MG/1
50 TABLET ORAL
Qty: 30 TABLET | Refills: 5 | OUTPATIENT
Start: 2021-04-05

## 2021-04-06 DIAGNOSIS — I10 ESSENTIAL HYPERTENSION: ICD-10-CM

## 2021-04-06 DIAGNOSIS — E03.4 HYPOTHYROIDISM DUE TO ACQUIRED ATROPHY OF THYROID: ICD-10-CM

## 2021-04-06 RX ORDER — LOSARTAN POTASSIUM 50 MG/1
50 TABLET ORAL DAILY
Qty: 90 TABLET | Refills: 0 | Status: CANCELLED | OUTPATIENT
Start: 2021-04-06

## 2021-04-06 RX ORDER — LEVOTHYROXINE SODIUM 0.05 MG/1
50 TABLET ORAL
Qty: 30 TABLET | Refills: 5 | OUTPATIENT
Start: 2021-04-06

## 2021-04-08 ENCOUNTER — TELEPHONE (OUTPATIENT)
Dept: INTERNAL MEDICINE CLINIC | Facility: CLINIC | Age: 56
End: 2021-04-08

## 2021-04-08 NOTE — TELEPHONE ENCOUNTER
Patient was sent a my chart message informing her that she still had 2 refills of levothyroxine on file at [facility] with any questions.

## 2021-04-12 ENCOUNTER — TELEPHONE (OUTPATIENT)
Dept: INTERNAL MEDICINE CLINIC | Facility: CLINIC | Age: 56
End: 2021-04-12

## 2021-04-12 NOTE — TELEPHONE ENCOUNTER
Fax request from [facility] for a refill on   LEVOTHYROXINE SODIUM 50 MCG Oral Tab 30 tablet

## 2021-05-14 DIAGNOSIS — G57.93 NEUROPATHY INVOLVING BOTH LOWER EXTREMITIES: ICD-10-CM

## 2021-05-14 DIAGNOSIS — I10 ESSENTIAL HYPERTENSION: ICD-10-CM

## 2021-05-14 RX ORDER — LOSARTAN POTASSIUM 50 MG/1
50 TABLET ORAL DAILY
Qty: 90 TABLET | Refills: 0 | Status: SHIPPED | OUTPATIENT
Start: 2021-05-14 | End: 2021-09-17

## 2021-05-14 RX ORDER — GABAPENTIN 100 MG/1
100 CAPSULE ORAL 2 TIMES DAILY
Qty: 180 CAPSULE | Refills: 0 | Status: SHIPPED | OUTPATIENT
Start: 2021-05-14 | End: 2021-09-17

## 2021-05-14 NOTE — TELEPHONE ENCOUNTER
Last time medication was refilled 1/5/21  Quantity and # of refills 180 cap w/ 0 refill  Last OV 1/5/21  Next OV physical due 05/18/2021

## 2021-05-14 NOTE — TELEPHONE ENCOUNTER
Fax request from [facility] in [place], [address] [facility] for refills on Losartan 50 mg qty 90 and Gabapentin 100 mg caps qty 180.

## 2021-08-03 DIAGNOSIS — E03.4 HYPOTHYROIDISM DUE TO ACQUIRED ATROPHY OF THYROID: ICD-10-CM

## 2021-08-03 DIAGNOSIS — I10 ESSENTIAL HYPERTENSION: ICD-10-CM

## 2021-08-03 RX ORDER — LEVOTHYROXINE SODIUM 0.05 MG/1
50 TABLET ORAL
Qty: 30 TABLET | Refills: 1 | Status: SHIPPED | OUTPATIENT
Start: 2021-08-03 | End: 2021-10-26

## 2021-08-03 RX ORDER — AMLODIPINE BESYLATE 5 MG/1
5 TABLET ORAL DAILY
Qty: 90 TABLET | Refills: 1 | Status: SHIPPED | OUTPATIENT
Start: 2021-08-03

## 2021-08-03 NOTE — TELEPHONE ENCOUNTER
Last time medication was refilled 12/9/20  Quantity and # of refills 30 tab w/ 5 refill  Last OV 1/5/2021  Next OV physical overdue 05/18/2021

## 2021-08-03 NOTE — TELEPHONE ENCOUNTER
Fax request from [facility] in [place] for a refill on Amlodipine Besylate 5 MG tabs qty 90.

## 2021-08-03 NOTE — TELEPHONE ENCOUNTER
Last time medication was refilled 9/2/20  Quantity and # of refills 90 tab w/ 1 refill  Last OV 1/5/21  Next OV physical overdue 5/18/21

## 2021-09-16 DIAGNOSIS — I10 ESSENTIAL HYPERTENSION: ICD-10-CM

## 2021-09-16 DIAGNOSIS — E78.1 HYPERTRIGLYCERIDEMIA: ICD-10-CM

## 2021-09-16 DIAGNOSIS — G57.93 NEUROPATHY INVOLVING BOTH LOWER EXTREMITIES: ICD-10-CM

## 2021-09-17 RX ORDER — GABAPENTIN 100 MG/1
CAPSULE ORAL
Qty: 180 CAPSULE | Refills: 0 | Status: SHIPPED | OUTPATIENT
Start: 2021-09-17 | End: 2021-12-15

## 2021-09-17 RX ORDER — GEMFIBROZIL 600 MG/1
TABLET, FILM COATED ORAL
Qty: 180 TABLET | Refills: 0 | Status: SHIPPED | OUTPATIENT
Start: 2021-09-17 | End: 2021-12-15

## 2021-09-17 RX ORDER — LOSARTAN POTASSIUM 50 MG/1
TABLET ORAL
Qty: 90 TABLET | Refills: 0 | Status: SHIPPED | OUTPATIENT
Start: 2021-09-17 | End: 2021-12-15

## 2021-10-26 DIAGNOSIS — E03.4 HYPOTHYROIDISM DUE TO ACQUIRED ATROPHY OF THYROID: ICD-10-CM

## 2021-10-26 DIAGNOSIS — R80.9 DIABETES MELLITUS WITH PROTEINURIA (HCC): ICD-10-CM

## 2021-10-26 DIAGNOSIS — E11.29 DIABETES MELLITUS WITH PROTEINURIA (HCC): ICD-10-CM

## 2021-10-26 RX ORDER — LEVOTHYROXINE SODIUM 0.05 MG/1
50 TABLET ORAL
Qty: 30 TABLET | Refills: 0 | Status: SHIPPED | OUTPATIENT
Start: 2021-10-26 | End: 2021-11-30

## 2021-10-26 RX ORDER — DAPAGLIFLOZIN 5 MG/1
TABLET, FILM COATED ORAL
Qty: 30 TABLET | Refills: 5 | Status: SHIPPED | OUTPATIENT
Start: 2021-10-26

## 2021-10-26 NOTE — TELEPHONE ENCOUNTER
Last time medication was refilled 1/26/21  Quantity and number of refills 30 w/ 5  Last OV 1/5/21  Next OV 11/9/21

## 2021-11-09 ENCOUNTER — OFFICE VISIT (OUTPATIENT)
Dept: INTERNAL MEDICINE CLINIC | Facility: CLINIC | Age: 56
End: 2021-11-09
Payer: COMMERCIAL

## 2021-11-09 VITALS
BODY MASS INDEX: 34.85 KG/M2 | HEART RATE: 82 BPM | HEIGHT: 58 IN | DIASTOLIC BLOOD PRESSURE: 64 MMHG | OXYGEN SATURATION: 96 % | TEMPERATURE: 97 F | WEIGHT: 166 LBS | SYSTOLIC BLOOD PRESSURE: 132 MMHG | RESPIRATION RATE: 12 BRPM

## 2021-11-09 DIAGNOSIS — Z13.220 LIPID SCREENING: ICD-10-CM

## 2021-11-09 DIAGNOSIS — F17.200 TOBACCO DEPENDENCE: ICD-10-CM

## 2021-11-09 DIAGNOSIS — Z12.4 CERVICAL CANCER SCREENING: ICD-10-CM

## 2021-11-09 DIAGNOSIS — Z23 NEED FOR INFLUENZA VACCINATION: ICD-10-CM

## 2021-11-09 DIAGNOSIS — Z12.31 ENCOUNTER FOR SCREENING MAMMOGRAM FOR MALIGNANT NEOPLASM OF BREAST: ICD-10-CM

## 2021-11-09 DIAGNOSIS — Z00.00 LABORATORY EXAMINATION ORDERED AS PART OF A ROUTINE GENERAL MEDICAL EXAMINATION: ICD-10-CM

## 2021-11-09 DIAGNOSIS — Z13.0 SCREENING, IRON DEFICIENCY ANEMIA: ICD-10-CM

## 2021-11-09 DIAGNOSIS — Z12.11 COLON CANCER SCREENING: ICD-10-CM

## 2021-11-09 DIAGNOSIS — Z00.00 ANNUAL PHYSICAL EXAM: Primary | ICD-10-CM

## 2021-11-09 DIAGNOSIS — E11.29 DIABETES MELLITUS WITH PROTEINURIA (HCC): ICD-10-CM

## 2021-11-09 DIAGNOSIS — R80.9 DIABETES MELLITUS WITH PROTEINURIA (HCC): ICD-10-CM

## 2021-11-09 DIAGNOSIS — Z13.89 SCREENING FOR GENITOURINARY CONDITION: ICD-10-CM

## 2021-11-09 DIAGNOSIS — Z13.29 THYROID DISORDER SCREEN: ICD-10-CM

## 2021-11-09 DIAGNOSIS — Z23 NEED FOR PNEUMOCOCCAL VACCINATION: ICD-10-CM

## 2021-11-09 PROBLEM — Z28.21 PNEUMOCOCCAL VACCINATION DECLINED BY PATIENT: Status: RESOLVED | Noted: 2020-08-03 | Resolved: 2021-11-09

## 2021-11-09 PROCEDURE — 90732 PPSV23 VACC 2 YRS+ SUBQ/IM: CPT | Performed by: INTERNAL MEDICINE

## 2021-11-09 PROCEDURE — 3075F SYST BP GE 130 - 139MM HG: CPT | Performed by: INTERNAL MEDICINE

## 2021-11-09 PROCEDURE — 3008F BODY MASS INDEX DOCD: CPT | Performed by: INTERNAL MEDICINE

## 2021-11-09 PROCEDURE — 90686 IIV4 VACC NO PRSV 0.5 ML IM: CPT | Performed by: INTERNAL MEDICINE

## 2021-11-09 PROCEDURE — 90471 IMMUNIZATION ADMIN: CPT | Performed by: INTERNAL MEDICINE

## 2021-11-09 PROCEDURE — 3078F DIAST BP <80 MM HG: CPT | Performed by: INTERNAL MEDICINE

## 2021-11-09 PROCEDURE — 90472 IMMUNIZATION ADMIN EACH ADD: CPT | Performed by: INTERNAL MEDICINE

## 2021-11-09 PROCEDURE — 99396 PREV VISIT EST AGE 40-64: CPT | Performed by: INTERNAL MEDICINE

## 2021-11-09 RX ORDER — BIOTIN 1 MG
1 TABLET ORAL DAILY
COMMUNITY

## 2021-11-09 NOTE — PROGRESS NOTES
Subjective:   Patient ID: Cuba Damico is a 55year old female.     HPI  HPI:   Cuba Damico is a 55year old female who presents for a complete physical exam. Symptoms: denies discharge, itching, burning or dysuria, is menopausal. Patient compl ALT 57 (H) 08/04/2020    ALT 64 (H) 05/19/2020       Current Outpatient Medications   Medication Sig Dispense Refill   • Cholecalciferol (VITAMIN D3) 25 MCG (1000 UT) Oral Cap Take 1 tablet by mouth daily.      • FARXIGA 5 MG Oral Tab TAKE 1 TABLET(5 MG) vision  HEENT: denies nasal congestion, sinus pain or ST  LUNGS: denies shortness of breath with exertion  CARDIOVASCULAR: denies chest pain on exertion  GI: denies abdominal pain,denies heartburn  : denies dysuria, vaginal discharge or itching,periods r times weekly. The patient indicates understanding of these issues and agrees to the plan. The patient is asked to return for CPX in 12 m.     History/Other:   Review of Systems  Current Outpatient Medications   Medication Sig Dispense Refill   • Zenobia vaccination  Need for pneumococcal vaccination    Orders Placed This Encounter      CBC With Differential With Platelet      Hemoglobin A1C      Comp Metabolic Panel (14)      Lipid Panel      TSH W Reflex To Free T4      Urinalysis, Routine      [facility]/

## 2021-11-19 ENCOUNTER — IMMUNIZATION (OUTPATIENT)
Dept: LAB | Facility: HOSPITAL | Age: 56
End: 2021-11-19
Attending: EMERGENCY MEDICINE
Payer: COMMERCIAL

## 2021-11-19 DIAGNOSIS — Z23 NEED FOR VACCINATION: Primary | ICD-10-CM

## 2021-11-19 PROCEDURE — 0064A SARSCOV2 VAC 50MCG/0.25ML IM: CPT

## 2021-11-30 DIAGNOSIS — E11.29 DIABETES MELLITUS WITH PROTEINURIA (HCC): ICD-10-CM

## 2021-11-30 DIAGNOSIS — R80.9 DIABETES MELLITUS WITH PROTEINURIA (HCC): ICD-10-CM

## 2021-11-30 DIAGNOSIS — E03.4 HYPOTHYROIDISM DUE TO ACQUIRED ATROPHY OF THYROID: ICD-10-CM

## 2021-11-30 RX ORDER — GLIMEPIRIDE 2 MG/1
TABLET ORAL
Qty: 180 TABLET | Refills: 0 | Status: SHIPPED | OUTPATIENT
Start: 2021-11-30

## 2021-11-30 RX ORDER — LEVOTHYROXINE SODIUM 0.05 MG/1
TABLET ORAL
Qty: 90 TABLET | Refills: 0 | Status: SHIPPED | OUTPATIENT
Start: 2021-11-30 | End: 2022-02-06

## 2021-12-15 DIAGNOSIS — E78.1 HYPERTRIGLYCERIDEMIA: ICD-10-CM

## 2021-12-15 DIAGNOSIS — I10 ESSENTIAL HYPERTENSION: ICD-10-CM

## 2021-12-15 DIAGNOSIS — G57.93 NEUROPATHY INVOLVING BOTH LOWER EXTREMITIES: ICD-10-CM

## 2021-12-15 RX ORDER — GEMFIBROZIL 600 MG/1
TABLET, FILM COATED ORAL
Qty: 180 TABLET | Refills: 0 | Status: SHIPPED | OUTPATIENT
Start: 2021-12-15

## 2021-12-15 RX ORDER — GABAPENTIN 100 MG/1
CAPSULE ORAL
Qty: 180 CAPSULE | Refills: 0 | Status: SHIPPED | OUTPATIENT
Start: 2021-12-15

## 2021-12-15 RX ORDER — LOSARTAN POTASSIUM 50 MG/1
TABLET ORAL
Qty: 90 TABLET | Refills: 0 | Status: SHIPPED | OUTPATIENT
Start: 2021-12-15

## 2021-12-15 NOTE — TELEPHONE ENCOUNTER
Last time medication was refilled 9/17/21  Quantity and number of refills 90 w/ 0  Last OV 11/9/21  Next OV 3/22

## 2022-02-06 RX ORDER — LEVOTHYROXINE SODIUM 0.05 MG/1
TABLET ORAL
Qty: 90 TABLET | Refills: 0 | Status: SHIPPED | OUTPATIENT
Start: 2022-02-06

## 2022-03-07 RX ORDER — ROSUVASTATIN CALCIUM 20 MG/1
20 TABLET, COATED ORAL DAILY
Qty: 30 TABLET | Refills: 0 | Status: SHIPPED | OUTPATIENT
Start: 2022-03-07

## 2022-03-07 NOTE — TELEPHONE ENCOUNTER
Fax request:  Rosuvastatin Calcium 20 MG Oral Tab 30 tablet     To be sent to:  [place] [address] [address] AT [address] [facility], [phone], [phone]

## 2022-03-07 NOTE — TELEPHONE ENCOUNTER
Last time medication was refilled 2/4/22  Quantity and # of refills 30 tab no refill  Last OV 11/9/21  Next OV Due 12/21

## 2022-04-13 RX ORDER — GLIMEPIRIDE 2 MG/1
TABLET ORAL
Qty: 60 TABLET | Refills: 0 | Status: SHIPPED | OUTPATIENT
Start: 2022-04-13

## 2022-04-13 RX ORDER — AMLODIPINE BESYLATE 5 MG/1
5 TABLET ORAL DAILY
Qty: 90 TABLET | Refills: 0 | Status: SHIPPED | OUTPATIENT
Start: 2022-04-13

## 2022-04-13 RX ORDER — GLIMEPIRIDE 2 MG/1
TABLET ORAL
Qty: 180 TABLET | Refills: 0 | Status: CANCELLED | OUTPATIENT
Start: 2022-04-13

## 2022-04-13 RX ORDER — LOSARTAN POTASSIUM 50 MG/1
50 TABLET ORAL DAILY
Qty: 90 TABLET | Refills: 0 | Status: SHIPPED | OUTPATIENT
Start: 2022-04-13

## 2022-04-13 NOTE — TELEPHONE ENCOUNTER
Last time medication was refilled 12/15/21  Quantity and # of refills 90 tabs w/0 refills   Last OV  11/9/21   Next OV No future appointments.

## 2022-04-13 NOTE — TELEPHONE ENCOUNTER
Losartan 50mg     [facility] [address] AT [address] [facility], [phone], [phone]

## 2022-04-13 NOTE — TELEPHONE ENCOUNTER
Last time medication was refilled  8/3/21-   Quantity and # of refills 90 tabs w/ 1 refill   Last OV  11/9/21  Next OV No future appointments.

## 2022-04-13 NOTE — TELEPHONE ENCOUNTER
Last time medication was refilled 11/30/21  Quantity and # of refills 180 tablets w 0 refills   Last OV  11/9/21   Next OV No future appointments.

## 2022-05-16 ENCOUNTER — TELEPHONE (OUTPATIENT)
Dept: INTERNAL MEDICINE CLINIC | Facility: CLINIC | Age: 57
End: 2022-05-16

## 2022-05-16 ENCOUNTER — OFFICE VISIT (OUTPATIENT)
Dept: INTERNAL MEDICINE CLINIC | Facility: CLINIC | Age: 57
End: 2022-05-16
Payer: COMMERCIAL

## 2022-05-16 VITALS
RESPIRATION RATE: 16 BRPM | WEIGHT: 166 LBS | OXYGEN SATURATION: 97 % | TEMPERATURE: 98 F | HEIGHT: 58 IN | HEART RATE: 72 BPM | BODY MASS INDEX: 34.85 KG/M2 | DIASTOLIC BLOOD PRESSURE: 68 MMHG | SYSTOLIC BLOOD PRESSURE: 122 MMHG

## 2022-05-16 DIAGNOSIS — I10 ESSENTIAL HYPERTENSION: ICD-10-CM

## 2022-05-16 DIAGNOSIS — Z12.11 COLON CANCER SCREENING: ICD-10-CM

## 2022-05-16 DIAGNOSIS — E11.29 DIABETES MELLITUS WITH PROTEINURIA (HCC): Primary | ICD-10-CM

## 2022-05-16 DIAGNOSIS — R80.9 DIABETES MELLITUS WITH PROTEINURIA (HCC): Primary | ICD-10-CM

## 2022-05-16 DIAGNOSIS — M25.562 CHRONIC PAIN OF LEFT KNEE: ICD-10-CM

## 2022-05-16 DIAGNOSIS — Z12.4 CERVICAL CANCER SCREENING: ICD-10-CM

## 2022-05-16 DIAGNOSIS — Z12.31 ENCOUNTER FOR SCREENING MAMMOGRAM FOR MALIGNANT NEOPLASM OF BREAST: ICD-10-CM

## 2022-05-16 DIAGNOSIS — F17.200 TOBACCO DEPENDENCE: ICD-10-CM

## 2022-05-16 DIAGNOSIS — G89.29 CHRONIC PAIN OF LEFT KNEE: ICD-10-CM

## 2022-05-16 PROCEDURE — 3078F DIAST BP <80 MM HG: CPT | Performed by: INTERNAL MEDICINE

## 2022-05-16 PROCEDURE — 99214 OFFICE O/P EST MOD 30 MIN: CPT | Performed by: INTERNAL MEDICINE

## 2022-05-16 PROCEDURE — 3074F SYST BP LT 130 MM HG: CPT | Performed by: INTERNAL MEDICINE

## 2022-05-16 PROCEDURE — 3008F BODY MASS INDEX DOCD: CPT | Performed by: INTERNAL MEDICINE

## 2022-05-16 NOTE — TELEPHONE ENCOUNTER
Please call the office at [phone] to schedule your follow up appointment with Dr. Livier Anguiano

## 2022-05-17 DIAGNOSIS — R80.9 DIABETES MELLITUS WITH PROTEINURIA (HCC): ICD-10-CM

## 2022-05-17 DIAGNOSIS — E11.29 DIABETES MELLITUS WITH PROTEINURIA (HCC): ICD-10-CM

## 2022-05-17 DIAGNOSIS — E78.1 HYPERTRIGLYCERIDEMIA: ICD-10-CM

## 2022-05-17 RX ORDER — ROSUVASTATIN CALCIUM 20 MG/1
20 TABLET, COATED ORAL DAILY
Qty: 30 TABLET | Refills: 0 | Status: SHIPPED | OUTPATIENT
Start: 2022-05-17

## 2022-05-17 NOTE — TELEPHONE ENCOUNTER
rosuvastatin 20 MG Oral Tab #30    [facility] [address] AT [address] [facility], [phone], [phone]

## 2022-05-17 NOTE — TELEPHONE ENCOUNTER
Last time medication was refilled 3/7/22   Quantity and # of refills 30 tabs w/0 refills   Last OV  5/16   Next OV No future appointments. Labs: has orders outstanding.      Routed to provider per protocol

## 2022-05-18 ENCOUNTER — TELEPHONE (OUTPATIENT)
Dept: ORTHOPEDICS CLINIC | Facility: CLINIC | Age: 57
End: 2022-05-18

## 2022-05-18 DIAGNOSIS — M25.562 LEFT KNEE PAIN, UNSPECIFIED CHRONICITY: Primary | ICD-10-CM

## 2022-06-06 ENCOUNTER — HOSPITAL ENCOUNTER (OUTPATIENT)
Dept: GENERAL RADIOLOGY | Age: 57
Discharge: HOME | End: 2022-06-06
Attending: ORTHOPAEDIC SURGERY
Payer: COMMERCIAL

## 2022-06-06 ENCOUNTER — OFFICE VISIT (OUTPATIENT)
Dept: ORTHOPEDICS CLINIC | Facility: CLINIC | Age: 57
End: 2022-06-06
Payer: COMMERCIAL

## 2022-06-06 VITALS — BODY MASS INDEX: 34.94 KG/M2 | WEIGHT: 164.19 LBS | OXYGEN SATURATION: 98 % | HEART RATE: 84 BPM | HEIGHT: 57.5 IN

## 2022-06-06 DIAGNOSIS — M25.562 LEFT KNEE PAIN, UNSPECIFIED CHRONICITY: ICD-10-CM

## 2022-06-06 DIAGNOSIS — M17.12 PRIMARY OSTEOARTHRITIS OF LEFT KNEE: Primary | ICD-10-CM

## 2022-06-06 PROCEDURE — 73564 X-RAY EXAM KNEE 4 OR MORE: CPT | Performed by: ORTHOPAEDIC SURGERY

## 2022-06-09 DIAGNOSIS — E11.29 DIABETES MELLITUS WITH PROTEINURIA (HCC): ICD-10-CM

## 2022-06-09 DIAGNOSIS — R80.9 DIABETES MELLITUS WITH PROTEINURIA (HCC): ICD-10-CM

## 2022-06-09 RX ORDER — GLIMEPIRIDE 2 MG/1
2 TABLET ORAL 2 TIMES DAILY WITH MEALS
Qty: 180 TABLET | Refills: 0 | Status: SHIPPED | OUTPATIENT
Start: 2022-06-09

## 2022-06-09 NOTE — TELEPHONE ENCOUNTER
Last time medication was refilled 4/13/22  Quantity and # of refills 60/0  Last OV 5/16/22  Next OV none scheduled

## 2022-06-10 DIAGNOSIS — E78.1 HYPERTRIGLYCERIDEMIA: ICD-10-CM

## 2022-06-10 RX ORDER — GEMFIBROZIL 600 MG/1
600 TABLET, FILM COATED ORAL
Qty: 180 TABLET | Refills: 0 | Status: SHIPPED | OUTPATIENT
Start: 2022-06-10

## 2022-06-10 NOTE — TELEPHONE ENCOUNTER
Fax request:  GEMFIBROZIL 600 MG Oral Tab 180 tablet     To be sent to:  [facility] [address] AT [address] [facility], [phone], [phone]

## 2022-06-10 NOTE — TELEPHONE ENCOUNTER
Last time medication was refilled 12/15/21   Quantity and # of refills  180 tabs w/0 refills   Last OV  5/16/22   Next OV No future appointments. Labs: last 12/30/20    Outstanding lab orders. Called patient and left detailed message to complete labs to ensure future refills.

## 2022-06-13 DIAGNOSIS — G57.93 NEUROPATHY INVOLVING BOTH LOWER EXTREMITIES: ICD-10-CM

## 2022-06-13 RX ORDER — GABAPENTIN 100 MG/1
100 CAPSULE ORAL 2 TIMES DAILY
Qty: 180 CAPSULE | Refills: 0 | Status: SHIPPED | OUTPATIENT
Start: 2022-06-13

## 2022-06-13 NOTE — TELEPHONE ENCOUNTER
Refill request    [facility]    -[phone]      [facility]-[phone]      GABAPENTIN 100 MG Oral Cap     Quantity-180

## 2022-07-07 DIAGNOSIS — R80.9 DIABETES MELLITUS WITH PROTEINURIA (HCC): ICD-10-CM

## 2022-07-07 DIAGNOSIS — E11.29 DIABETES MELLITUS WITH PROTEINURIA (HCC): ICD-10-CM

## 2022-07-07 DIAGNOSIS — E78.1 HYPERTRIGLYCERIDEMIA: ICD-10-CM

## 2022-07-07 DIAGNOSIS — E03.4 HYPOTHYROIDISM DUE TO ACQUIRED ATROPHY OF THYROID: ICD-10-CM

## 2022-07-07 RX ORDER — ROSUVASTATIN CALCIUM 20 MG/1
TABLET, COATED ORAL
Qty: 90 TABLET | Refills: 0 | Status: SHIPPED | OUTPATIENT
Start: 2022-07-07

## 2022-07-07 RX ORDER — DAPAGLIFLOZIN 5 MG/1
TABLET, FILM COATED ORAL
Qty: 30 TABLET | Refills: 5 | Status: SHIPPED | OUTPATIENT
Start: 2022-07-07

## 2022-07-07 RX ORDER — LEVOTHYROXINE SODIUM 0.05 MG/1
50 TABLET ORAL
Qty: 90 TABLET | Refills: 0 | Status: SHIPPED | OUTPATIENT
Start: 2022-07-07

## 2022-07-07 NOTE — TELEPHONE ENCOUNTER
Last time medication was refilled 5/17/22  Quantity and # of refills 30/5  Last OV 5/16/22  Next OV none scheduld

## 2022-07-07 NOTE — TELEPHONE ENCOUNTER
Last time medication was refilled 2/6/22  Quantity and # of refills 90/0  Last OV 5/16/22  Next OV none scheduled

## 2022-07-14 NOTE — TELEPHONE ENCOUNTER
3 24 22 MOD EDEMA 5 WKS - LUCENTIS AND KEEP 5 WKS - CONSIDER EYLEA IF NO IMPROVEMENT. Sent to Dr. Brenden Armstrong for approval.

## 2022-07-22 DIAGNOSIS — I10 ESSENTIAL HYPERTENSION: ICD-10-CM

## 2022-07-22 RX ORDER — LOSARTAN POTASSIUM 50 MG/1
TABLET ORAL
Qty: 90 TABLET | Refills: 0 | Status: SHIPPED | OUTPATIENT
Start: 2022-07-22

## 2022-07-22 RX ORDER — AMLODIPINE BESYLATE 5 MG/1
TABLET ORAL
Qty: 90 TABLET | Refills: 0 | Status: SHIPPED | OUTPATIENT
Start: 2022-07-22

## 2024-08-06 ENCOUNTER — TELEPHONE (OUTPATIENT)
Dept: INTERNAL MEDICINE CLINIC | Facility: CLINIC | Age: 59
End: 2024-08-06

## 2024-08-06 DIAGNOSIS — E11.29 DIABETES MELLITUS WITH PROTEINURIA (HCC): ICD-10-CM

## 2024-08-06 DIAGNOSIS — I10 ESSENTIAL HYPERTENSION: ICD-10-CM

## 2024-08-06 DIAGNOSIS — E78.1 HYPERTRIGLYCERIDEMIA: ICD-10-CM

## 2024-08-06 DIAGNOSIS — R80.9 DIABETES MELLITUS WITH PROTEINURIA (HCC): ICD-10-CM

## 2024-08-06 DIAGNOSIS — E03.4 HYPOTHYROIDISM DUE TO ACQUIRED ATROPHY OF THYROID: ICD-10-CM

## 2024-08-06 DIAGNOSIS — G57.93 NEUROPATHY INVOLVING BOTH LOWER EXTREMITIES: ICD-10-CM

## 2024-08-06 RX ORDER — LOSARTAN POTASSIUM 50 MG/1
50 TABLET ORAL DAILY
Qty: 30 TABLET | Refills: 0 | Status: SHIPPED | OUTPATIENT
Start: 2024-08-06

## 2024-08-06 RX ORDER — GEMFIBROZIL 600 MG/1
600 TABLET, FILM COATED ORAL
Qty: 60 TABLET | Refills: 0 | OUTPATIENT
Start: 2024-08-06

## 2024-08-06 RX ORDER — ROSUVASTATIN CALCIUM 20 MG/1
20 TABLET, COATED ORAL DAILY
Qty: 30 TABLET | Refills: 0 | Status: SHIPPED | OUTPATIENT
Start: 2024-08-06

## 2024-08-06 RX ORDER — GABAPENTIN 100 MG/1
100 CAPSULE ORAL 2 TIMES DAILY
Qty: 180 CAPSULE | Refills: 0 | Status: SHIPPED | OUTPATIENT
Start: 2024-08-06

## 2024-08-06 RX ORDER — AMLODIPINE BESYLATE 5 MG/1
5 TABLET ORAL DAILY
Qty: 90 TABLET | Refills: 0 | Status: SHIPPED | OUTPATIENT
Start: 2024-08-06

## 2024-08-06 RX ORDER — LEVOTHYROXINE SODIUM 0.05 MG/1
50 TABLET ORAL
Qty: 30 TABLET | Refills: 0 | Status: SHIPPED | OUTPATIENT
Start: 2024-08-06

## 2024-08-06 RX ORDER — DAPAGLIFLOZIN 5 MG/1
5 TABLET, FILM COATED ORAL EVERY MORNING
Qty: 30 TABLET | Refills: 0 | Status: SHIPPED | OUTPATIENT
Start: 2024-08-06

## 2024-08-06 RX ORDER — GEMFIBROZIL 600 MG/1
600 TABLET, FILM COATED ORAL
Qty: 60 TABLET | Refills: 0 | Status: SHIPPED | OUTPATIENT
Start: 2024-08-06

## 2024-08-06 RX ORDER — DAPAGLIFLOZIN 5 MG/1
5 TABLET, FILM COATED ORAL EVERY MORNING
Qty: 30 TABLET | Refills: 0 | OUTPATIENT
Start: 2024-08-06

## 2024-08-06 RX ORDER — ROSUVASTATIN CALCIUM 20 MG/1
20 TABLET, COATED ORAL DAILY
Qty: 30 TABLET | Refills: 0 | OUTPATIENT
Start: 2024-08-06

## 2024-08-06 RX ORDER — GLIMEPIRIDE 2 MG/1
2 TABLET ORAL 2 TIMES DAILY WITH MEALS
Qty: 60 TABLET | Refills: 0 | OUTPATIENT
Start: 2024-08-06

## 2024-08-06 RX ORDER — GLIMEPIRIDE 2 MG/1
2 TABLET ORAL 2 TIMES DAILY WITH MEALS
Qty: 60 TABLET | Refills: 0 | Status: SHIPPED | OUTPATIENT
Start: 2024-08-06

## 2024-08-06 RX ORDER — LOSARTAN POTASSIUM 50 MG/1
50 TABLET ORAL DAILY
Qty: 30 TABLET | Refills: 0 | OUTPATIENT
Start: 2024-08-06

## 2024-08-06 NOTE — TELEPHONE ENCOUNTER
Isabell Preston is a 73 y.o. 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 Patient was recently admitted to the hospital for shortness of breath and underwent a thoracentesis on 6/29 which removed 2L of bloody fluid.   CT Chest showed hematoma at site of thoracentesis and reaccumulation of pleural fluid  - Hg 10 to 5.6 today with a basline Hg of 12.    - INR 1.3  - Right pleural fluid cytology negative for malignant cells from 6/29    Plan  - Transfuse for Hg <7, received 2 units PRBCs yesterday  - Hold anticoagulation  - Place pressure dressing over hematoma   - IR consulted, no role for urgent/emergent embolization at this time.  - VATS planned for today    Pharmacy Problem    Drug Interaction Notification    Please advise if ok to dispense   rosuvastatin 20 MG Oral Tab  and    gemfibrozil 600 MG Oral Tab  together    Fax in triage for reference

## 2024-08-06 NOTE — TELEPHONE ENCOUNTER
FARXIGA 5 MG Oral Tab   Last time medication was refilled 07/07/2022  Last office visit  05/16/2022  Next office visit due/scheduled No future appointments.    Medication failed protocol, routed to Doctor Gatica.     Patient due for annual,  to coordinate. Last seen in 2022.    gemfibrozil 600 MG Oral Tab   Last time medication was refilled 06/10/2022  Last office visit  05/16/2022  Next office visit due/scheduled No future appointments.    Medication failed protocol, routed to Doctor Gatica.     glimepiride 2 MG Oral Tab   Last time medication was refilled 06/09/2022  Last office visit  05/16/2022  Next office visit due/scheduled No future appointments.    Medication failed protocol, routed to Doctor Gatica.     LOSARTAN 50 MG Oral Tab   Last time medication was refilled 07/22/2022  Last office visit  05/16/2022  Next office visit due/scheduled No future appointments.    Medication failed protocol, routed to Doctor Gatica.       ROSUVASTATIN 20 MG Oral Tab   Last time medication was refilled 07/07/2022  Last office visit  05/16/2022  Next office visit due/scheduled No future appointments.    Medication failed protocol, routed to Doctor Gatica.

## 2024-08-06 NOTE — TELEPHONE ENCOUNTER
gabapentin 100 MG Oral Cap   Last time medication was refilled 07/22/2022  Last office visit  05/16/2022  Next office visit due/scheduled No future appointments.    Medication not on protocol, routed to Catalina King Nurse Practitioner.       AMLODIPINE 5 MG Oral Tab   Last time medication was refilled 07/22/2022  Last office visit  05/16/2022  Next office visit due/scheduled No future appointments.    Medication failed protocol, routed to Catalina King Nurse Practitioner.     Patient due for annual exam,  to coordinate. Last seen in 2022.

## 2024-08-06 NOTE — TELEPHONE ENCOUNTER
Last time medication was refilled 07/07/2022  Last office visit  05/16/2022  Next office visit due/scheduled No future appointments.    Medication failed protocol, routed to Dylan Vasques Physician Assistant.     Patient due for annual exam,  to coordinate. Last seen in 2022.

## 2024-08-06 NOTE — TELEPHONE ENCOUNTER
Medication is over the counter supplement. Patient has not been seen in 2 years, no labs. Medication denied.

## 2024-08-06 NOTE — TELEPHONE ENCOUNTER
Per NILAY Richardson patient takes Rosuvastatin and Lopid. Okay to dispense. Fax sent back to pharmacy.

## 2024-09-28 DIAGNOSIS — I10 ESSENTIAL HYPERTENSION: ICD-10-CM

## 2024-09-28 DIAGNOSIS — E11.29 DIABETES MELLITUS WITH PROTEINURIA (HCC): ICD-10-CM

## 2024-09-28 DIAGNOSIS — R80.9 DIABETES MELLITUS WITH PROTEINURIA (HCC): ICD-10-CM

## 2024-09-28 DIAGNOSIS — E78.1 HYPERTRIGLYCERIDEMIA: ICD-10-CM

## 2024-09-29 RX ORDER — ROSUVASTATIN CALCIUM 20 MG/1
20 TABLET, COATED ORAL DAILY
Qty: 30 TABLET | Refills: 0 | Status: SHIPPED | OUTPATIENT
Start: 2024-09-29

## 2024-09-29 RX ORDER — LOSARTAN POTASSIUM 50 MG/1
50 TABLET ORAL DAILY
Qty: 30 TABLET | Refills: 0 | Status: SHIPPED | OUTPATIENT
Start: 2024-09-29

## 2024-09-29 RX ORDER — DAPAGLIFLOZIN 5 MG/1
5 TABLET, FILM COATED ORAL EVERY MORNING
Qty: 30 TABLET | Refills: 0 | Status: SHIPPED | OUTPATIENT
Start: 2024-09-29

## 2024-09-29 NOTE — TELEPHONE ENCOUNTER
Last time medication was refilled: 8/6/24  Next office visit due/scheduled: no apt  Last office visit: 5/16/22  Last Labs: 1/5/21  Failed protocol

## 2024-10-04 DIAGNOSIS — E11.29 DIABETES MELLITUS WITH PROTEINURIA (HCC): ICD-10-CM

## 2024-10-04 DIAGNOSIS — E78.1 HYPERTRIGLYCERIDEMIA: ICD-10-CM

## 2024-10-04 DIAGNOSIS — R80.9 DIABETES MELLITUS WITH PROTEINURIA (HCC): ICD-10-CM

## 2024-10-04 RX ORDER — ROSUVASTATIN CALCIUM 20 MG/1
20 TABLET, COATED ORAL DAILY
Qty: 30 TABLET | Refills: 0 | OUTPATIENT
Start: 2024-10-04

## 2024-10-24 DIAGNOSIS — E11.29 DIABETES MELLITUS WITH PROTEINURIA (HCC): ICD-10-CM

## 2024-10-24 DIAGNOSIS — R80.9 DIABETES MELLITUS WITH PROTEINURIA (HCC): ICD-10-CM

## 2024-10-24 RX ORDER — GLIMEPIRIDE 2 MG/1
2 TABLET ORAL 2 TIMES DAILY WITH MEALS
Qty: 60 TABLET | Refills: 0 | Status: SHIPPED | OUTPATIENT
Start: 2024-10-24

## 2024-10-24 NOTE — TELEPHONE ENCOUNTER
Last time medication was refilled 08/06/2024  Last office visit  05/16/2022  Next office visit due/scheduled No future appointments.      Medication failed protocol.

## 2024-11-19 ENCOUNTER — TELEPHONE (OUTPATIENT)
Dept: INTERNAL MEDICINE CLINIC | Facility: CLINIC | Age: 59
End: 2024-11-19

## 2024-11-19 DIAGNOSIS — E78.1 HYPERTRIGLYCERIDEMIA: ICD-10-CM

## 2024-11-19 DIAGNOSIS — E11.29 DIABETES MELLITUS WITH PROTEINURIA (HCC): ICD-10-CM

## 2024-11-19 DIAGNOSIS — R80.9 DIABETES MELLITUS WITH PROTEINURIA (HCC): ICD-10-CM

## 2024-11-19 DIAGNOSIS — I10 ESSENTIAL HYPERTENSION: ICD-10-CM

## 2024-11-19 RX ORDER — AMLODIPINE BESYLATE 5 MG/1
5 TABLET ORAL DAILY
Qty: 90 TABLET | Refills: 0 | Status: SHIPPED | OUTPATIENT
Start: 2024-11-19

## 2024-11-19 RX ORDER — ROSUVASTATIN CALCIUM 20 MG/1
20 TABLET, COATED ORAL DAILY
Qty: 30 TABLET | Refills: 0 | Status: SHIPPED | OUTPATIENT
Start: 2024-11-19

## 2024-11-19 NOTE — TELEPHONE ENCOUNTER
Dr. Gatica refilling medication for patient today. Does not recommend patient take both Rosuvastatin and Lopid as this can cause muscle aches. Patient needs to stop Lopid and come into office for annual exam. LOV: 5/16/22    [employer] message sent to patient.

## 2024-11-19 NOTE — TELEPHONE ENCOUNTER
Last time medication was refilled 09/29/2024  Last office visit  05/16/2022  Next office visit due/scheduled No Future Appointments      Medication failed protocol.

## 2024-11-19 NOTE — TELEPHONE ENCOUNTER
Last time medication was refilled 08/06/2024  Last office visit  05/16/2022  Next office visit due/scheduled No Future Appointments      Medication failed protocol.

## 2024-12-26 DIAGNOSIS — E78.1 HYPERTRIGLYCERIDEMIA: ICD-10-CM

## 2024-12-26 DIAGNOSIS — R80.9 DIABETES MELLITUS WITH PROTEINURIA (HCC): ICD-10-CM

## 2024-12-26 DIAGNOSIS — E11.29 DIABETES MELLITUS WITH PROTEINURIA (HCC): ICD-10-CM

## 2024-12-26 DIAGNOSIS — I10 ESSENTIAL HYPERTENSION: ICD-10-CM

## 2024-12-27 RX ORDER — GLIMEPIRIDE 2 MG/1
TABLET ORAL
Qty: 60 TABLET | Refills: 0 | Status: SHIPPED | OUTPATIENT
Start: 2024-12-27

## 2024-12-27 RX ORDER — LOSARTAN POTASSIUM 50 MG/1
50 TABLET ORAL DAILY
Qty: 30 TABLET | Refills: 0 | Status: SHIPPED | OUTPATIENT
Start: 2024-12-27

## 2024-12-27 RX ORDER — ROSUVASTATIN CALCIUM 20 MG/1
20 TABLET, COATED ORAL DAILY
Qty: 30 TABLET | Refills: 0 | Status: SHIPPED | OUTPATIENT
Start: 2024-12-27

## 2024-12-27 NOTE — TELEPHONE ENCOUNTER
Last time medication was refilled 10/24/2024  Last office visit  05/16/2022  Next office visit due/scheduled No future appointments.    Medications failed protocol.    LOV 2022  to coordinate appointment with patient.

## 2025-01-28 DIAGNOSIS — E78.1 HYPERTRIGLYCERIDEMIA: ICD-10-CM

## 2025-01-28 DIAGNOSIS — R80.9 DIABETES MELLITUS WITH PROTEINURIA (HCC): ICD-10-CM

## 2025-01-28 DIAGNOSIS — E11.29 DIABETES MELLITUS WITH PROTEINURIA (HCC): ICD-10-CM

## 2025-01-28 DIAGNOSIS — I10 ESSENTIAL HYPERTENSION: ICD-10-CM

## 2025-01-28 DIAGNOSIS — G57.93 NEUROPATHY INVOLVING BOTH LOWER EXTREMITIES: ICD-10-CM

## 2025-01-28 RX ORDER — ROSUVASTATIN CALCIUM 20 MG/1
20 TABLET, COATED ORAL DAILY
Qty: 30 TABLET | Refills: 0 | Status: SHIPPED | OUTPATIENT
Start: 2025-01-28

## 2025-01-28 RX ORDER — GABAPENTIN 100 MG/1
100 CAPSULE ORAL 2 TIMES DAILY
Qty: 180 CAPSULE | Refills: 0 | Status: SHIPPED | OUTPATIENT
Start: 2025-01-28

## 2025-01-28 NOTE — TELEPHONE ENCOUNTER
Last time medication was refilled 12/27/2024  Last office visit  05/16/2022  Next office visit due/scheduled          Future Appointments   Date Time Provider Department Center   2/13/2025  4:00 PM Agus Gatica MD [facility]         Medication not on protocol.     LOV 2022.    to coordinate appointment with patient.

## 2025-01-28 NOTE — TELEPHONE ENCOUNTER
Gemfibrozil  Last time medication was refilled 8/6/24  Last office visit  5/16/22  Next office visit due/scheduled No future appointments  Medication failed protocol    Losartan  Last time medication was refilled 12/27/24  Last office visit  5/16/22  Next office visit due/scheduled No future appointments  Medication failed protocol    Glimepiride  Last time medication was refilled 12/27/24  Last office visit  5/16/22  Next office visit due/scheduled No future appointments  Medication failed protocol

## 2025-01-28 NOTE — TELEPHONE ENCOUNTER
Last time medication was refilled 08/06/2024  Last office visit  05/16/2022  Next office visit due/scheduled   Future Appointments   Date Time Provider Department Center   2/13/2025  4:00 PM Agus Gatica MD [facility]       Medication not on protocol.    LOV 2022.    to coordinate appointment with patient.

## 2025-01-29 RX ORDER — GLIMEPIRIDE 2 MG/1
2 TABLET ORAL 2 TIMES DAILY
Qty: 40 TABLET | Refills: 0 | Status: SHIPPED | OUTPATIENT
Start: 2025-01-29

## 2025-01-29 RX ORDER — LOSARTAN POTASSIUM 50 MG/1
50 TABLET ORAL DAILY
Qty: 20 TABLET | Refills: 0 | Status: SHIPPED | OUTPATIENT
Start: 2025-01-29

## 2025-01-29 RX ORDER — GEMFIBROZIL 600 MG/1
600 TABLET, FILM COATED ORAL
Qty: 40 TABLET | Refills: 0 | Status: SHIPPED | OUTPATIENT
Start: 2025-01-29

## 2025-02-12 PROBLEM — E78.5 HYPERLIPIDEMIA ASSOCIATED WITH TYPE 2 DIABETES MELLITUS (HCC): Status: ACTIVE | Noted: 2020-05-29

## 2025-02-12 PROBLEM — E11.69 HYPERLIPIDEMIA ASSOCIATED WITH TYPE 2 DIABETES MELLITUS (HCC): Status: ACTIVE | Noted: 2020-05-29

## 2025-02-12 PROBLEM — E78.1 HYPERTRIGLYCERIDEMIA: Status: RESOLVED | Noted: 2020-05-29 | Resolved: 2025-02-12

## 2025-02-12 PROBLEM — I15.2 HYPERTENSION ASSOCIATED WITH DIABETES (HCC): Status: ACTIVE | Noted: 2020-05-18

## 2025-02-12 PROBLEM — E11.59 HYPERTENSION ASSOCIATED WITH DIABETES (HCC): Status: ACTIVE | Noted: 2020-05-18

## 2025-02-12 PROBLEM — I10 ESSENTIAL HYPERTENSION: Status: RESOLVED | Noted: 2020-05-18 | Resolved: 2025-02-12

## 2025-02-21 DIAGNOSIS — I10 ESSENTIAL HYPERTENSION: ICD-10-CM

## 2025-02-21 DIAGNOSIS — R80.9 DIABETES MELLITUS WITH PROTEINURIA (HCC): ICD-10-CM

## 2025-02-21 DIAGNOSIS — E11.29 DIABETES MELLITUS WITH PROTEINURIA (HCC): ICD-10-CM

## 2025-02-21 RX ORDER — LOSARTAN POTASSIUM 50 MG/1
TABLET ORAL
Qty: 5 TABLET | Refills: 0 | Status: SHIPPED | OUTPATIENT
Start: 2025-02-21

## 2025-02-21 RX ORDER — GLIMEPIRIDE 2 MG/1
TABLET ORAL
Qty: 10 TABLET | Refills: 0 | Status: SHIPPED | OUTPATIENT
Start: 2025-02-21

## 2025-02-21 RX ORDER — AMLODIPINE BESYLATE 5 MG/1
5 TABLET ORAL DAILY
Qty: 5 TABLET | Refills: 0 | Status: SHIPPED | OUTPATIENT
Start: 2025-02-21 | End: 2025-02-24 | Stop reason: ALTCHOICE

## 2025-02-21 NOTE — TELEPHONE ENCOUNTER
Losartan  Last time medication was refilled 1/29/25  Last office visit  5/16/22  Next office visit due/scheduled   Future Appointments   Date Time Provider Department Center   2/24/2025  5:00 PM Cherie Vasques PA-C [facility]       Patient last OV 2022, keeps canceling appointments.  Scheduled 2/24/25

## 2025-02-21 NOTE — TELEPHONE ENCOUNTER
Last time medication was refilled 11/19/24  Last office visit  5/16/22  Next office visit due/scheduled   Future Appointments   Date Time Provider Department Center   2/24/2025  5:00 PM Cherie Vasques PA-C [facility]     Patient last OV 5/16/2022, makes appointments and cancels after refills. Scheduled 2/24/25.

## 2025-02-24 ENCOUNTER — OFFICE VISIT (OUTPATIENT)
Dept: INTERNAL MEDICINE CLINIC | Facility: CLINIC | Age: 60
End: 2025-02-24
Payer: COMMERCIAL

## 2025-02-24 VITALS
WEIGHT: 164 LBS | BODY MASS INDEX: 34.9 KG/M2 | SYSTOLIC BLOOD PRESSURE: 120 MMHG | HEART RATE: 86 BPM | RESPIRATION RATE: 16 BRPM | TEMPERATURE: 97 F | DIASTOLIC BLOOD PRESSURE: 62 MMHG | OXYGEN SATURATION: 97 % | HEIGHT: 57.5 IN

## 2025-02-24 DIAGNOSIS — E11.69 HYPERLIPIDEMIA ASSOCIATED WITH TYPE 2 DIABETES MELLITUS (HCC): ICD-10-CM

## 2025-02-24 DIAGNOSIS — Z12.31 ENCOUNTER FOR SCREENING MAMMOGRAM FOR MALIGNANT NEOPLASM OF BREAST: ICD-10-CM

## 2025-02-24 DIAGNOSIS — Z00.00 ROUTINE PHYSICAL EXAMINATION: Primary | ICD-10-CM

## 2025-02-24 DIAGNOSIS — R53.82 CHRONIC FATIGUE: ICD-10-CM

## 2025-02-24 DIAGNOSIS — E78.5 HYPERLIPIDEMIA ASSOCIATED WITH TYPE 2 DIABETES MELLITUS (HCC): ICD-10-CM

## 2025-02-24 DIAGNOSIS — Z00.00 LABORATORY EXAM ORDERED AS PART OF ROUTINE GENERAL MEDICAL EXAMINATION: ICD-10-CM

## 2025-02-24 DIAGNOSIS — R80.9 DIABETES MELLITUS WITH PROTEINURIA (HCC): ICD-10-CM

## 2025-02-24 DIAGNOSIS — I15.2 HYPERTENSION ASSOCIATED WITH DIABETES (HCC): ICD-10-CM

## 2025-02-24 DIAGNOSIS — R94.31 ABNORMAL EKG: ICD-10-CM

## 2025-02-24 DIAGNOSIS — E03.4 HYPOTHYROIDISM DUE TO ACQUIRED ATROPHY OF THYROID: ICD-10-CM

## 2025-02-24 DIAGNOSIS — R00.2 PALPITATIONS: ICD-10-CM

## 2025-02-24 DIAGNOSIS — E11.59 HYPERTENSION ASSOCIATED WITH DIABETES (HCC): ICD-10-CM

## 2025-02-24 DIAGNOSIS — E11.29 DIABETES MELLITUS WITH PROTEINURIA (HCC): ICD-10-CM

## 2025-02-24 DIAGNOSIS — Z12.11 SCREEN FOR COLON CANCER: ICD-10-CM

## 2025-02-24 DIAGNOSIS — R06.83 SNORING: ICD-10-CM

## 2025-02-24 LAB
ATRIAL RATE: 81 BPM
P AXIS: 55 DEGREES
P-R INTERVAL: 156 MS
Q-T INTERVAL: 326 MS
QRS DURATION: 76 MS
QTC CALCULATION (BEZET): 378 MS
R AXIS: -15 DEGREES
T AXIS: 45 DEGREES
VENTRICULAR RATE: 81 BPM

## 2025-02-24 RX ORDER — METOPROLOL SUCCINATE 25 MG/1
25 TABLET, EXTENDED RELEASE ORAL DAILY
Qty: 30 TABLET | Refills: 0 | Status: SHIPPED | OUTPATIENT
Start: 2025-02-24

## 2025-02-24 NOTE — PATIENT INSTRUCTIONS
Have labs drawn, fasting 8-10 hours prior (water before is ok).  Submit FIT test for colon cancer screening  Schedule stress test  Sleep study, mammogram, eye exam

## 2025-02-24 NOTE — PROGRESS NOTES
Wellness Exam    CC: Patient is presenting for a wellness exam    HPI:   Concerns: last visit 3 years ago. She has been caring for parents in [facility], and insurance lapsed at one point. Has not been checking her blood sugar, 'i don't like poking my finger'. Not interested in CGM.   Not taking farxiga. Would like to restart metformin - it previously caused diarrhea, but took a friend's metformin recently and tolerated it fine    C/o palpitations. This occurs occasionally, she thinks it's triggered when her sugar is high. Does get winded on exertion. No regular exercise.      Has ongoing L knee pain, x-ray by ortho in 2022 showed arthritis, she declined steroid injection. Difficult to walk quickly or long distances due to the pain    Son states pt snores, has some pauses in breathing at night. Concerned about sleep apnea.   Gyne:     Health Maintenance   Topic Date Due    Pap Smear  Never done         Denies pelvic pain, abnormal discharge or genital lesions.    Breast Cancer Screening:    Health Maintenance   Topic Date Due    Mammogram  05/19/2021      Colon cancer screening:    Health Maintenance   Topic Date Due    Colorectal Cancer Screening  05/21/2021          Pertinent Family History:   Family History   Problem Relation Age of Onset    Diabetes Mother     Diabetes Father     Other (cad) Father       History reviewed. No pertinent past medical history.  History reviewed. No pertinent surgical history.  Social History     Socioeconomic History    Marital status: Single   Tobacco Use    Smoking status: Light Smoker     Types: Cigarettes    Smokeless tobacco: Never   Vaping Use    Vaping status: Never Used   Substance and Sexual Activity    Alcohol use: Yes     Comment: socially    Drug use: Never    Sexual activity: Yes     Medications Ordered Prior to Encounter[1]  Tobacco:  Social History     Tobacco Use   Smoking Status Light Smoker    Types: Cigarettes   Smokeless Tobacco Never     E-Cigarettes/Vaping        Questions Responses    E-Cigarette Use Never User           Tobacco cessation counseling for <3 minutes.         Review of Systems   Constitutional: Negative for fever, chills and fatigue.   HENT: Negative for hearing loss, congestion, sore throat and neck pain.    Eyes: Negative for pain and visual disturbance.   Respiratory: Negative for cough and shortness of breath.    Cardiovascular: Negative for chest pain and palpitations.   Gastrointestinal: Negative for nausea, vomiting, abdominal pain and diarrhea.   Genitourinary: Negative for urgency, frequency of urination, and abnormal vaginal bleeding.   Musculoskeletal: Negative for arthralgias and gait problem.   Skin: Negative for color change and rash.   Neurological: Negative for tremors, weakness and numbness.   Hematological: Negative for adenopathy. Does not bruise/bleed easily.   Psychiatric/Behavioral: Negative for confusion and agitation. The patient is not nervous/anxious.      /62   Pulse 86   Temp 97.2 °F (36.2 °C) (Temporal)   Resp 16   Ht 4' 9.5\" (1.461 m)   Wt 164 lb (74.4 kg)   SpO2 97%   BMI 34.87 kg/m²   Physical Exam   Constitutional: She is oriented to person, place, and time. She appears well-developed. No distress.    Head: Normocephalic and atraumatic.   Eyes: EOM are normal. Pupils are equal, round, and reactive to light. No scleral icterus.   ENT: TM's clear, nose normal, no oropharyngeal exudates or tonsillar hypertrophy    Neck: Normal range of motion. No thyromegaly present.   Cardiovascular: Normal rate, regular rhythm and normal heart sounds.  No murmur or friction rub heard.  Pulmonary/Chest: Effort normal and breath sounds normal bilaterally. She has no wheezes or rales.   Abdominal: Soft. Bowel sounds are normal. There is no tenderness. No HSM.  Musculoskeletal: Normal range of motion. She exhibits no edema.   Lymphadenopathy: She has no cervical, supraclavicular, or axillary adenopathy.   :  declined  Neurological: She is alert and oriented to person, place, and time. DTRs are +2 and symmetric. Cranial nerves grossly intact.  Skin: Skin is warm. No rash noted. No erythema, pallor or jaundice.   Psychiatric: She has a normal mood and affect and her behavior is normal.   Bilateral barefoot skin diabetic exam is normal, visualized feet and the appearance is normal.  Bilateral monofilament/sensation of both feet is normal.  Pulsation pedal pulse exam of both lower legs/feet is normal as well.     Assessment and Plan:  Trish Aadm is a 59 year old female here for a wellness exam.  Age appropriate cancer screening, labs, safety, immunizations were discussed with the patient and ordered as follows:  1. Routine physical examination (Primary)  Overdue for preventative care and monitoring of chronic conditions, pt last seen 3 years ago. Stressed importance of regular monitoring and screening. Pt expressed understanding.  2. Screen for colon cancer  Declines c-scope check FIT test  -     Occult Blood, Fecal, FIT Immunoassay; Future; Expected date: 02/24/2025  3. Laboratory exam ordered as part of routine general medical examination  -     CBC With Differential With Platelet; Future; Expected date: 02/24/2025  -     Comp Metabolic Panel (14); Future; Expected date: 02/24/2025  -     Lipid Panel; Future; Expected date: 02/24/2025  -     TSH W Reflex To Free T4; Future; Expected date: 02/24/2025  -     Urinalysis, Routine; Future; Expected date: 02/24/2025  4. Diabetes mellitus with proteinuria (HCC)  Noncompliant with meds: took herself of farxiga, does state she is taking glimepiride. Last labs were in 2020, A1c was 9%.   She is requesting to start metformin because she tolerated it well when she took a friend's and she doesn't want to restart farxiga. Check labs, will adjust meds according to results, patient only on glimepiride currently. Referred for eye exam. She agrees to go for blood test first thing  tomorrow morning.  -     OPHTHALMOLOGY - INTERNAL  -     Hemoglobin A1C; Future; Expected date: 02/24/2025  -     Microalb/Creat Ratio, Random Urine; Future; Expected date: 02/24/2025  -     CARD NUC STRESS TEST LEXISCAN (CPT 27711/67231/); Future; Expected date: 02/24/2025  5. Encounter for screening mammogram for malignant neoplasm of breast  -     [facility] KARIN 2D+3D SCREENING BILAT (CPT=77067/45289); Future; Expected date: 02/24/2025  6. Palpitations  EKG in office shows NSR, rate 81. No ST changes or T wave inversion. +Q waves in inferior leads, possible inferior infarct. This was not present on previous EKG from 5/20/20. Low voltage QRS also noted.     Recommend resting lexiscan stress test due to abnormal EKG, changed compared to 2020, with risk factors of diabetes, HTN, HLD, and palpitations. Patient cannot walk at pace of an exercise stress test due to chronic knee pain, so lexiscan was ordered.  Pt requesting to switch to beta blocker for palpitations. Stop amlodipine, start metoprolol ER 25mg daily.  -     EKG with interpretation and Report -IN OFFICE [55504]  -     Metoprolol Succinate ER; Take 1 tablet (25 mg total) by mouth daily.  Dispense: 30 tablet; Refill: 0  -     CARD NUC STRESS TEST LEXISCAN (CPT 29538/88013/); Future; Expected date: 02/24/2025  7. Chronic fatigue, 8. Snoring  -     Diagnostic Sleep Study  -     General sleep study; Future; Expected date: 03/24/2025  9. Hypertension associated with diabetes (HCC)  Continue losartan; stop amlodipine start metoprolol for palpitations. Complete stress test.  -     Metoprolol Succinate ER; Take 1 tablet (25 mg total) by mouth daily.  Dispense: 30 tablet; Refill: 0  10. Hyperlipidemia associated with type 2 diabetes mellitus (HCC)  Continue statin; check labs  11. Abnormal EKG  -     CARD NUC STRESS TEST LEXISCAN (CPT 32787/09567/); Future; Expected date: 02/24/2025   12. Hypothyroidism   Patient took herself off of levothyroxine. Check  labs. Encouraged better med compliance.    Discussed use of sunscreen, wearing seatbelt, recommend regular cardiovascular and weight bearing exercise as well as a well-rounded diet.    Return in about 1 month (around 3/24/2025) for med check, follow-up.     Patient/Caregiver Education:  Patient/Caregiver Education: There are no barriers to learning. Medical education done.  Outcome: Patient verbalizes understanding.       Educated by: NILAY       [1]   Current Outpatient Medications on File Prior to Visit   Medication Sig Dispense Refill    LOSARTAN 50 MG Oral Tab Take 1 tablet by mouth daily. MUST KEEP APPOINTMENT FOR ADDITIONAL REFILLS. 5 tablet 0    GLIMEPIRIDE 2 MG Oral Tab Take 1 tablet by mouth in the morning and 1 tablet before bedtime. MUST KEEP APPOINTMENT FOR ADDITIONAL REFILLS. 10 tablet 0    gemfibrozil 600 MG Oral Tab Take 1 tablet (600 mg total) by mouth 2 (two) times daily before meals. MUST KEEP APPOINTMENT FOR ADDITIONAL REFILLS 40 tablet 0    ROSUVASTATIN 20 MG Oral Tab TAKE 1 TABLET BY MOUTH EVERY DAY 30 tablet 0    GABAPENTIN 100 MG Oral Cap TAKE 1 CAPSULE BY MOUTH 2 TIMES A  capsule 0    Cholecalciferol (VITAMIN D3) 25 MCG (1000 UT) Oral Cap Take 1 tablet by mouth daily.      Microlet Lancets Does not apply Misc Use to test blood sugar twice daily. 200 each 1    Glucose Blood (CONTOUR NEXT TEST) In Vitro Strip Test blood sugar twice a day 200 each 1    aspirin 81 MG Oral Tab Take 1 tablet (81 mg total) by mouth daily.      Vitamin C 500 MG Oral Tab Take 1 tablet (500 mg total) by mouth daily.       No current facility-administered medications on file prior to visit.

## 2025-02-25 ENCOUNTER — LAB ENCOUNTER (OUTPATIENT)
Dept: LAB | Age: 60
End: 2025-02-25
Attending: PHYSICIAN ASSISTANT
Payer: COMMERCIAL

## 2025-02-25 DIAGNOSIS — Z00.00 LABORATORY EXAM ORDERED AS PART OF ROUTINE GENERAL MEDICAL EXAMINATION: ICD-10-CM

## 2025-02-25 DIAGNOSIS — R80.9 DIABETES MELLITUS WITH PROTEINURIA (HCC): ICD-10-CM

## 2025-02-25 DIAGNOSIS — E11.29 DIABETES MELLITUS WITH PROTEINURIA (HCC): ICD-10-CM

## 2025-02-25 LAB
ALBUMIN SERPL-MCNC: 5 G/DL (ref 3.2–4.8)
ALBUMIN/GLOB SERPL: 1.7 {RATIO} (ref 1–2)
ALP LIVER SERPL-CCNC: 88 U/L
ALT SERPL-CCNC: 32 U/L
ANION GAP SERPL CALC-SCNC: 11 MMOL/L (ref 0–18)
AST SERPL-CCNC: 25 U/L (ref ?–34)
BASOPHILS # BLD AUTO: 0.09 X10(3) UL (ref 0–0.2)
BASOPHILS NFR BLD AUTO: 0.9 %
BILIRUB SERPL-MCNC: 0.4 MG/DL (ref 0.3–1.2)
BUN BLD-MCNC: 18 MG/DL (ref 9–23)
CALCIUM BLD-MCNC: 9.9 MG/DL (ref 8.7–10.6)
CHLORIDE SERPL-SCNC: 102 MMOL/L (ref 98–112)
CHOLEST SERPL-MCNC: 219 MG/DL (ref ?–200)
CO2 SERPL-SCNC: 26 MMOL/L (ref 21–32)
CREAT BLD-MCNC: 1.06 MG/DL
EGFRCR SERPLBLD CKD-EPI 2021: 61 ML/MIN/1.73M2 (ref 60–?)
EOSINOPHIL # BLD AUTO: 0.5 X10(3) UL (ref 0–0.7)
EOSINOPHIL NFR BLD AUTO: 4.8 %
ERYTHROCYTE [DISTWIDTH] IN BLOOD BY AUTOMATED COUNT: 12.7 %
EST. AVERAGE GLUCOSE BLD GHB EST-MCNC: 292 MG/DL (ref 68–126)
FASTING PATIENT LIPID ANSWER: YES
FASTING STATUS PATIENT QL REPORTED: YES
GLOBULIN PLAS-MCNC: 3 G/DL (ref 2–3.5)
GLUCOSE BLD-MCNC: 259 MG/DL (ref 70–99)
HBA1C MFR BLD: 11.8 % (ref ?–5.7)
HCT VFR BLD AUTO: 43.4 %
HDLC SERPL-MCNC: 38 MG/DL (ref 40–59)
HGB BLD-MCNC: 14.2 G/DL
IMM GRANULOCYTES # BLD AUTO: 0.04 X10(3) UL (ref 0–1)
IMM GRANULOCYTES NFR BLD: 0.4 %
LDLC SERPL CALC-MCNC: 109 MG/DL (ref ?–100)
LYMPHOCYTES # BLD AUTO: 3.33 X10(3) UL (ref 1–4)
LYMPHOCYTES NFR BLD AUTO: 31.8 %
MCH RBC QN AUTO: 29.6 PG (ref 26–34)
MCHC RBC AUTO-ENTMCNC: 32.7 G/DL (ref 31–37)
MCV RBC AUTO: 90.6 FL
MONOCYTES # BLD AUTO: 0.52 X10(3) UL (ref 0.1–1)
MONOCYTES NFR BLD AUTO: 5 %
NEUTROPHILS # BLD AUTO: 5.98 X10 (3) UL (ref 1.5–7.7)
NEUTROPHILS # BLD AUTO: 5.98 X10(3) UL (ref 1.5–7.7)
NEUTROPHILS NFR BLD AUTO: 57.1 %
NONHDLC SERPL-MCNC: 181 MG/DL (ref ?–130)
OSMOLALITY SERPL CALC.SUM OF ELEC: 299 MOSM/KG (ref 275–295)
PLATELET # BLD AUTO: 280 10(3)UL (ref 150–450)
POTASSIUM SERPL-SCNC: 4.3 MMOL/L (ref 3.5–5.1)
PROT SERPL-MCNC: 8 G/DL (ref 5.7–8.2)
RBC # BLD AUTO: 4.79 X10(6)UL
SODIUM SERPL-SCNC: 139 MMOL/L (ref 136–145)
TRIGL SERPL-MCNC: 420 MG/DL (ref 30–149)
TSI SER-ACNC: 4.08 UIU/ML (ref 0.55–4.78)
VLDLC SERPL CALC-MCNC: 74 MG/DL (ref 0–30)
WBC # BLD AUTO: 10.5 X10(3) UL (ref 4–11)

## 2025-02-25 PROCEDURE — 80061 LIPID PANEL: CPT | Performed by: PHYSICIAN ASSISTANT

## 2025-02-25 PROCEDURE — 80050 GENERAL HEALTH PANEL: CPT | Performed by: PHYSICIAN ASSISTANT

## 2025-02-25 PROCEDURE — 83036 HEMOGLOBIN GLYCOSYLATED A1C: CPT | Performed by: PHYSICIAN ASSISTANT

## 2025-02-26 DIAGNOSIS — R80.9 DIABETES MELLITUS WITH PROTEINURIA (HCC): ICD-10-CM

## 2025-02-26 DIAGNOSIS — I10 ESSENTIAL HYPERTENSION: ICD-10-CM

## 2025-02-26 DIAGNOSIS — E11.29 DIABETES MELLITUS WITH PROTEINURIA (HCC): ICD-10-CM

## 2025-02-26 RX ORDER — LANCETS
EACH MISCELLANEOUS
Qty: 200 EACH | Refills: 1 | Status: SHIPPED | OUTPATIENT
Start: 2025-02-26

## 2025-02-26 NOTE — TELEPHONE ENCOUNTER
Contour test strips  Last time medication was refilled 1/26/21  Last office visit  2/24/25  Next office visit due/scheduled   Future Appointments   Date Time Provider Department Center   4/7/2025 12:15 PM Agus Gatica MD [facility]     Passed protocol, Medication sent.    Microlet lancets  Last time medication was refilled 1/27/21  Last office visit  2/24/25  Next office visit due/scheduled   Future Appointments   Date Time Provider Department Center   4/7/2025 12:15 PM Agus Gatica MD [facility]   Passed protocol, Medication sent.      
details…

## 2025-02-27 DIAGNOSIS — E78.1 HYPERTRIGLYCERIDEMIA: ICD-10-CM

## 2025-02-27 RX ORDER — LOSARTAN POTASSIUM 50 MG/1
TABLET ORAL
Qty: 5 TABLET | Refills: 0 | OUTPATIENT
Start: 2025-02-27

## 2025-02-27 RX ORDER — GEMFIBROZIL 600 MG/1
TABLET, FILM COATED ORAL
Qty: 40 TABLET | Refills: 0 | Status: SHIPPED | OUTPATIENT
Start: 2025-02-27

## 2025-02-27 NOTE — TELEPHONE ENCOUNTER
Last time medication was refilled 02/21/2025    Refill request too soon, [employer] message sent to patient.

## 2025-02-27 NOTE — TELEPHONE ENCOUNTER
Last time medication was refilled 01/29/2025  Last office visit  02/24/2025  Next office visit due/scheduled   Future Appointments   Date Time Provider Department Center   4/7/2025 12:15 PM Agus Gatica MD [facility]     Medication not on protocol.

## 2025-03-05 DIAGNOSIS — R80.9 DIABETES MELLITUS WITH PROTEINURIA (HCC): ICD-10-CM

## 2025-03-05 DIAGNOSIS — E11.29 DIABETES MELLITUS WITH PROTEINURIA (HCC): ICD-10-CM

## 2025-03-05 DIAGNOSIS — E78.1 HYPERTRIGLYCERIDEMIA: ICD-10-CM

## 2025-03-06 RX ORDER — ROSUVASTATIN CALCIUM 20 MG/1
20 TABLET, COATED ORAL DAILY
Qty: 30 TABLET | Refills: 0 | Status: SHIPPED | OUTPATIENT
Start: 2025-03-06

## 2025-03-06 NOTE — TELEPHONE ENCOUNTER
Last time medication was refilled 01/28/2025  Last office visit  02/24/2025  Next office visit due/scheduled   Future Appointments   Date Time Provider Department Center   4/7/2025 12:15 PM Agus Gatica MD [facility]     Medication passed protocol, routed to review medication contradiction.

## 2025-03-15 DIAGNOSIS — E78.1 HYPERTRIGLYCERIDEMIA: ICD-10-CM

## 2025-03-15 RX ORDER — GEMFIBROZIL 600 MG/1
TABLET, FILM COATED ORAL
Qty: 40 TABLET | Refills: 0 | Status: SHIPPED | OUTPATIENT
Start: 2025-03-15

## 2025-03-15 NOTE — TELEPHONE ENCOUNTER
Last time medication was refilled: 2/27/25  Next office visit due/scheduled: 4/7/25  Last office visit: 2/24/25  Last Labs: 2/25/25

## 2025-03-21 DIAGNOSIS — R00.2 PALPITATIONS: ICD-10-CM

## 2025-03-21 DIAGNOSIS — E11.59 HYPERTENSION ASSOCIATED WITH DIABETES (HCC): ICD-10-CM

## 2025-03-21 DIAGNOSIS — I15.2 HYPERTENSION ASSOCIATED WITH DIABETES (HCC): ICD-10-CM

## 2025-03-21 RX ORDER — METOPROLOL SUCCINATE 25 MG/1
25 TABLET, EXTENDED RELEASE ORAL DAILY
Qty: 30 TABLET | Refills: 2 | Status: SHIPPED | OUTPATIENT
Start: 2025-03-21

## 2025-03-21 NOTE — TELEPHONE ENCOUNTER
Last time medication was refilled 2/24/25  Last office visit  2/24/25  Next office visit due/scheduled   Future Appointments   Date Time Provider Department Center   4/7/2025 12:15 PM Agus Gatica MD [facility]     Passed protocol, Medication sent.

## 2025-03-26 ENCOUNTER — TELEPHONE (OUTPATIENT)
Dept: INTERNAL MEDICINE CLINIC | Facility: CLINIC | Age: 60
End: 2025-03-26

## 2025-03-26 NOTE — TELEPHONE ENCOUNTER
Elise Ruiz  [facility] Clinical Staff  Hello,      Please note that the requested provider the patient is being referred to is NOT an In-Network Provider with the patient's [facility] [place] [employer] plan.  Please re-direct the patient to an In-Network Provider.  This referral will be closed as Dr Akila Woods is NOT participating in the [facility] [place] [employer] plans.    The patient can also contact [facility] [place] [employer] customer service on the back of their insurance card to find a participating provider.    Thank you,  Elise  Patient Referral Representative

## 2025-04-04 DIAGNOSIS — R80.9 DIABETES MELLITUS WITH PROTEINURIA (HCC): ICD-10-CM

## 2025-04-04 DIAGNOSIS — E78.1 HYPERTRIGLYCERIDEMIA: ICD-10-CM

## 2025-04-04 DIAGNOSIS — E11.29 DIABETES MELLITUS WITH PROTEINURIA (HCC): ICD-10-CM

## 2025-04-04 RX ORDER — ROSUVASTATIN CALCIUM 20 MG/1
20 TABLET, COATED ORAL DAILY
Qty: 30 TABLET | Refills: 0 | Status: SHIPPED | OUTPATIENT
Start: 2025-04-04

## 2025-04-04 NOTE — TELEPHONE ENCOUNTER
Last time medication was refilled 03/06/2025  Last office visit  02/24/2025  Next office visit due/scheduled   Future Appointments   Date Time Provider Department Center   4/7/2025 12:15 PM Agus Gatica MD [facility]     Medication passed protocol, routed to review medication contradiction.

## 2025-04-06 PROBLEM — R80.9 DIABETES MELLITUS WITH PROTEINURIA (HCC): Status: RESOLVED | Noted: 2020-06-01 | Resolved: 2025-04-06

## 2025-04-06 PROBLEM — E11.29 DIABETES MELLITUS WITH PROTEINURIA (HCC): Status: RESOLVED | Noted: 2020-06-01 | Resolved: 2025-04-06

## 2025-04-06 PROBLEM — E11.8 DIABETES MELLITUS WITH COMPLICATION (HCC): Status: ACTIVE | Noted: 2020-06-01

## 2025-04-07 ENCOUNTER — OFFICE VISIT (OUTPATIENT)
Dept: INTERNAL MEDICINE CLINIC | Facility: CLINIC | Age: 60
End: 2025-04-07
Payer: COMMERCIAL

## 2025-04-07 VITALS
WEIGHT: 162 LBS | RESPIRATION RATE: 16 BRPM | SYSTOLIC BLOOD PRESSURE: 130 MMHG | HEIGHT: 57 IN | DIASTOLIC BLOOD PRESSURE: 72 MMHG | OXYGEN SATURATION: 96 % | BODY MASS INDEX: 34.95 KG/M2 | TEMPERATURE: 97 F | HEART RATE: 83 BPM

## 2025-04-07 DIAGNOSIS — E78.5 HYPERLIPIDEMIA ASSOCIATED WITH TYPE 2 DIABETES MELLITUS (HCC): ICD-10-CM

## 2025-04-07 DIAGNOSIS — E11.59 HYPERTENSION ASSOCIATED WITH DIABETES (HCC): ICD-10-CM

## 2025-04-07 DIAGNOSIS — E11.69 HYPERLIPIDEMIA ASSOCIATED WITH TYPE 2 DIABETES MELLITUS (HCC): ICD-10-CM

## 2025-04-07 DIAGNOSIS — I15.2 HYPERTENSION ASSOCIATED WITH DIABETES (HCC): ICD-10-CM

## 2025-04-07 DIAGNOSIS — E11.8 DIABETES MELLITUS WITH COMPLICATION (HCC): Primary | ICD-10-CM

## 2025-04-07 PROCEDURE — 3046F HEMOGLOBIN A1C LEVEL >9.0%: CPT | Performed by: INTERNAL MEDICINE

## 2025-04-07 PROCEDURE — 3078F DIAST BP <80 MM HG: CPT | Performed by: INTERNAL MEDICINE

## 2025-04-07 PROCEDURE — 3008F BODY MASS INDEX DOCD: CPT | Performed by: INTERNAL MEDICINE

## 2025-04-07 PROCEDURE — 3075F SYST BP GE 130 - 139MM HG: CPT | Performed by: INTERNAL MEDICINE

## 2025-04-07 PROCEDURE — 99214 OFFICE O/P EST MOD 30 MIN: CPT | Performed by: INTERNAL MEDICINE

## 2025-04-07 RX ORDER — METFORMIN HYDROCHLORIDE 500 MG/1
500 TABLET, EXTENDED RELEASE ORAL 2 TIMES DAILY WITH MEALS
COMMUNITY
Start: 2025-04-07

## 2025-04-07 RX ORDER — DAPAGLIFLOZIN 5 MG/1
10 TABLET, FILM COATED ORAL EVERY MORNING
COMMUNITY
Start: 2025-04-07

## 2025-04-07 NOTE — PROGRESS NOTES
Subjective:   Patient ID: Trish Adam is a 59 year old female.    HPI  HPI:   Trish Adam is a 59 year old female who presents for recheck of her diabetes and HTN   Patient’s FBS have been 130-160. No hypoglycemia.  Pt has been checking her feet on a regular basis. Pt denies any tingling of the feet.  She denies cp or griffiths    Wt Readings from Last 6 Encounters:   02/24/25 164 lb (74.4 kg)   06/06/22 164 lb 3.2 oz (74.5 kg)   05/16/22 166 lb (75.3 kg)   11/09/21 166 lb (75.3 kg)   01/05/21 164 lb (74.4 kg)   12/11/20 165 lb (74.8 kg)     There is no height or weight on file to calculate BMI.     Lab Results   Component Value Date    A1C 11.8 (H) 02/25/2025    A1C 7.4 (H) 12/30/2020    A1C 9.0 (H) 08/04/2020     Lab Results   Component Value Date    CHOLEST 219 (H) 02/25/2025    CHOLEST 202 (H) 12/30/2020    CHOLEST 191 08/04/2020     Lab Results   Component Value Date    HDL 38 (L) 02/25/2025    HDL 44 12/30/2020    HDL 44 08/04/2020     Lab Results   Component Value Date     (H) 02/25/2025     (H) 12/30/2020     (H) 08/04/2020     Lab Results   Component Value Date    TRIG 420 (H) 02/25/2025    TRIG 286 (H) 12/30/2020    TRIG 232 (H) 08/04/2020     Lab Results   Component Value Date    AST 25 02/25/2025    AST 18 12/30/2020    AST 23 08/04/2020     Lab Results   Component Value Date    ALT 32 02/25/2025    ALT 24 12/30/2020    ALT 57 (H) 08/04/2020     Malb/Cre Calc   Date Value Ref Range Status   05/21/2020 61.3 (H) <=30.0 ug/mg Final     Comment:     <30 ug/mg creatinine       Normal     ug/mg creatinine   Microalbuminuria   >300 ug/mg creatinine      Albuminuria           Current Outpatient Medications   Medication Sig Dispense Refill    GEMFIBROZIL 600 MG Oral Tab TAKE 1 TABLET BY MOUTH 2 TIMES A DAY BEFORE MEALS. MUST KEEP APPOINTMENT FOR ADDITIONAL REFILLS 40 tablet 0    losartan 50 MG Oral Tab Take 1 tablet (50 mg total) by mouth daily. 30 tablet 2    Glucose Blood  (CONTOUR NEXT TEST) In Vitro Strip Test blood sugar twice a day 200 each 1    Microlet Lancets Does not apply Misc Use to test blood sugar twice daily. 200 each 1    ROSUVASTATIN 20 MG Oral Tab TAKE 1 TABLET BY MOUTH EVERY DAY 30 tablet 0    metoprolol succinate ER 25 MG Oral Tablet 24 Hr TAKE 1 TABLET BY MOUTH EVERY DAY 30 tablet 2    glimepiride 2 MG Oral Tab Take 4 mg twice daily. 120 tablet 2    dapagliflozin (FARXIGA) 5 MG Oral Tab Take 1 tablet (5 mg total) by mouth every morning. 30 tablet 2    metFORMIN  MG Oral Tablet 24 Hr Take 1 tablet daily at dinner for 1 week, then take 2 tablets daily at dinner for 1 week, then take 3 tablets daily at dinner, and stay on 3 tablets daily going forward. 90 tablet 3    GABAPENTIN 100 MG Oral Cap TAKE 1 CAPSULE BY MOUTH 2 TIMES A  capsule 0    Cholecalciferol (VITAMIN D3) 25 MCG (1000 UT) Oral Cap Take 1 tablet by mouth daily.      aspirin 81 MG Oral Tab Take 1 tablet (81 mg total) by mouth daily.      Vitamin C 500 MG Oral Tab Take 1 tablet (500 mg total) by mouth daily.        No past medical history on file.   No past surgical history on file.   Social History:   Social History     Socioeconomic History    Marital status: Single   Tobacco Use    Smoking status: Light Smoker     Types: Cigarettes    Smokeless tobacco: Never   Vaping Use    Vaping status: Never Used   Substance and Sexual Activity    Alcohol use: Yes     Comment: socially    Drug use: Never    Sexual activity: Yes     Exercise: none.  Diet: watches minimally     REVIEW OF SYSTEMS:   GENERAL HEALTH: feels well otherwise  SKIN: denies any unusual skin lesions or rashes  RESPIRATORY: denies shortness of breath with exertion  CARDIOVASCULAR: denies chest pain on exertion  GI: denies abdominal pain and denies heartburn  NEURO: denies headaches    EXAM:   There were no vitals taken for this visit.  GENERAL: well developed, well nourished,in no apparent distress  SKIN: no rashes,no suspicious  lesions  NECK: supple,no adenopathy,no bruits  LUNGS: clear to auscultation  CARDIO: RRR without murmur  GI: good BS's,no masses, HSM or tenderness  EXTREMITIES: no cyanosis, clubbing or edema  NEURO: Bilateral barefoot skin diabetic exam is normal, visualized feet and the appearance is normal.  Bilateral monofilament/sensation of both feet is normal.  Pulsation pedal pulse exam of both lower legs/feet is normal as well.        ASSESSMENT AND PLAN:   Trish Adam is a 59 year old female who presents for a recheck of Diabetes mellitus with complication (HCC) which is stable but uncontrolled and Hypertension associated with diabetes (HCC) which is controlled and Hyperlipidemia associated with diabetes (HCC) which is also controlled    Recommendations are: continue present meds but increase farxiga to 10 mg daily., check HgbA1C then med titration in 3 m, fasting lipids and CMP, lose wgt with carbohydrate controlled diet and exercise,  check feet daily.  The patient indicates understanding of these issues and agrees to the plan.  The patient is asked to return in 3m.    History/Other:   Review of Systems  Current Outpatient Medications   Medication Sig Dispense Refill    GEMFIBROZIL 600 MG Oral Tab TAKE 1 TABLET BY MOUTH 2 TIMES A DAY BEFORE MEALS. MUST KEEP APPOINTMENT FOR ADDITIONAL REFILLS 40 tablet 0    losartan 50 MG Oral Tab Take 1 tablet (50 mg total) by mouth daily. 30 tablet 2    Glucose Blood (CONTOUR NEXT TEST) In Vitro Strip Test blood sugar twice a day 200 each 1    Microlet Lancets Does not apply Misc Use to test blood sugar twice daily. 200 each 1    ROSUVASTATIN 20 MG Oral Tab TAKE 1 TABLET BY MOUTH EVERY DAY 30 tablet 0    metoprolol succinate ER 25 MG Oral Tablet 24 Hr TAKE 1 TABLET BY MOUTH EVERY DAY 30 tablet 2    glimepiride 2 MG Oral Tab Take 4 mg twice daily. 120 tablet 2    dapagliflozin (FARXIGA) 5 MG Oral Tab Take 1 tablet (5 mg total) by mouth every morning. 30 tablet 2    metFORMIN ER  500 MG Oral Tablet 24 Hr Take 1 tablet daily at dinner for 1 week, then take 2 tablets daily at dinner for 1 week, then take 3 tablets daily at dinner, and stay on 3 tablets daily going forward. 90 tablet 3    GABAPENTIN 100 MG Oral Cap TAKE 1 CAPSULE BY MOUTH 2 TIMES A  capsule 0    Cholecalciferol (VITAMIN D3) 25 MCG (1000 UT) Oral Cap Take 1 tablet by mouth daily.      aspirin 81 MG Oral Tab Take 1 tablet (81 mg total) by mouth daily.      Vitamin C 500 MG Oral Tab Take 1 tablet (500 mg total) by mouth daily.       Allergies:Allergies[1]    Objective:   Physical Exam    Assessment & Plan:   1. Diabetes mellitus with complication (HCC)    2. Hyperlipidemia associated with type 2 diabetes mellitus (HCC)    3. Hypertension associated with diabetes (HCC)        No orders of the defined types were placed in this encounter.      Meds This Visit:  Requested Prescriptions      No prescriptions requested or ordered in this encounter       Imaging & Referrals:  None         [1] No Active Allergies

## 2025-04-18 ENCOUNTER — PATIENT MESSAGE (OUTPATIENT)
Dept: INTERNAL MEDICINE CLINIC | Facility: CLINIC | Age: 60
End: 2025-04-18

## 2025-04-18 DIAGNOSIS — E11.29 DIABETES MELLITUS WITH PROTEINURIA (HCC): ICD-10-CM

## 2025-04-18 DIAGNOSIS — R80.9 DIABETES MELLITUS WITH PROTEINURIA (HCC): ICD-10-CM

## 2025-04-20 RX ORDER — DAPAGLIFLOZIN 5 MG/1
10 TABLET, FILM COATED ORAL EVERY MORNING
Qty: 60 TABLET | Refills: 5 | Status: SHIPPED | OUTPATIENT
Start: 2025-04-20 | End: 2025-04-21 | Stop reason: CLARIF

## 2025-04-21 ENCOUNTER — TELEPHONE (OUTPATIENT)
Dept: INTERNAL MEDICINE CLINIC | Facility: CLINIC | Age: 60
End: 2025-04-21

## 2025-04-21 RX ORDER — DAPAGLIFLOZIN 10 MG/1
10 TABLET, FILM COATED ORAL DAILY
Qty: 90 TABLET | Refills: 1 | Status: SHIPPED | OUTPATIENT
Start: 2025-04-21

## 2025-04-21 NOTE — TELEPHONE ENCOUNTER
Patient's son called stating that he spoke with insurance, and he mentions that it would be cheaper to prescribe 10MG Farxiga.     He is asking if this can be prescribed in stead.     Preferred pharmacy is the [id number]    Okay to prescribe?

## 2025-04-28 DIAGNOSIS — E78.1 HYPERTRIGLYCERIDEMIA: ICD-10-CM

## 2025-04-28 DIAGNOSIS — E11.29 DIABETES MELLITUS WITH PROTEINURIA (HCC): ICD-10-CM

## 2025-04-28 DIAGNOSIS — R80.9 DIABETES MELLITUS WITH PROTEINURIA (HCC): ICD-10-CM

## 2025-04-28 RX ORDER — ROSUVASTATIN CALCIUM 20 MG/1
20 TABLET, COATED ORAL DAILY
Qty: 30 TABLET | Refills: 0 | Status: SHIPPED | OUTPATIENT
Start: 2025-04-28

## 2025-04-28 NOTE — TELEPHONE ENCOUNTER
Last time medication was refilled 4/4/25  Last office visit  4/7/25  Next office visit due/scheduled   Future Appointments   Date Time Provider Department Center   6/6/2025 12:15 PM Agus Gatica MD [facility]     Passed protocol, Medication sent.

## 2025-05-08 ENCOUNTER — LAB ENCOUNTER (OUTPATIENT)
Dept: LAB | Age: 60
End: 2025-05-08
Attending: INTERNAL MEDICINE
Payer: COMMERCIAL

## 2025-05-08 DIAGNOSIS — R80.9 DIABETES MELLITUS WITH PROTEINURIA (HCC): ICD-10-CM

## 2025-05-08 DIAGNOSIS — Z00.00 LABORATORY EXAM ORDERED AS PART OF ROUTINE GENERAL MEDICAL EXAMINATION: ICD-10-CM

## 2025-05-08 DIAGNOSIS — E11.29 DIABETES MELLITUS WITH PROTEINURIA (HCC): ICD-10-CM

## 2025-05-08 LAB
BILIRUB UR QL STRIP.AUTO: NEGATIVE
CLARITY UR REFRACT.AUTO: CLEAR
COLOR UR AUTO: COLORLESS
CREAT UR-SCNC: 7.1 MG/DL
GLUCOSE UR STRIP.AUTO-MCNC: NORMAL MG/DL
KETONES UR STRIP.AUTO-MCNC: NEGATIVE MG/DL
LEUKOCYTE ESTERASE UR QL STRIP.AUTO: 500
MICROALBUMIN UR-MCNC: 0.8 MG/DL
MICROALBUMIN/CREAT 24H UR-RTO: 112.7 UG/MG (ref ?–30)
NITRITE UR QL STRIP.AUTO: NEGATIVE
PH UR STRIP.AUTO: 6.5 [PH] (ref 5–8)
PROT UR STRIP.AUTO-MCNC: NEGATIVE MG/DL
SP GR UR STRIP.AUTO: <1.005 (ref 1–1.03)
UROBILINOGEN UR STRIP.AUTO-MCNC: NORMAL MG/DL

## 2025-05-08 PROCEDURE — 82043 UR ALBUMIN QUANTITATIVE: CPT | Performed by: PHYSICIAN ASSISTANT

## 2025-05-08 PROCEDURE — 82570 ASSAY OF URINE CREATININE: CPT | Performed by: PHYSICIAN ASSISTANT

## 2025-05-08 PROCEDURE — 81001 URINALYSIS AUTO W/SCOPE: CPT | Performed by: PHYSICIAN ASSISTANT

## 2025-05-16 DIAGNOSIS — G57.93 NEUROPATHY INVOLVING BOTH LOWER EXTREMITIES: ICD-10-CM

## 2025-05-17 RX ORDER — GABAPENTIN 100 MG/1
100 CAPSULE ORAL 2 TIMES DAILY
Qty: 180 CAPSULE | Refills: 0 | Status: SHIPPED | OUTPATIENT
Start: 2025-05-17

## 2025-05-17 NOTE — TELEPHONE ENCOUNTER
Last time medication was refilled: 1/28/25  Next office visit due/scheduled: 6/6/25  Last office visit: 4/7/25  Last Labs: 2/25/25

## 2025-05-24 DIAGNOSIS — R80.9 DIABETES MELLITUS WITH PROTEINURIA (HCC): ICD-10-CM

## 2025-05-24 DIAGNOSIS — E11.29 DIABETES MELLITUS WITH PROTEINURIA (HCC): ICD-10-CM

## 2025-05-24 RX ORDER — GLIMEPIRIDE 2 MG/1
4 TABLET ORAL 2 TIMES DAILY
Qty: 120 TABLET | Refills: 0 | Status: SHIPPED | OUTPATIENT
Start: 2025-05-24

## 2025-05-25 NOTE — TELEPHONE ENCOUNTER
Last time medication was refilled: 2/26/25  Next office visit due/scheduled: 6/6/25  Last office visit: 4/7/25  Last Labs: 5/8/25

## 2025-05-26 DIAGNOSIS — R80.9 DIABETES MELLITUS WITH PROTEINURIA (HCC): ICD-10-CM

## 2025-05-26 DIAGNOSIS — E78.1 HYPERTRIGLYCERIDEMIA: ICD-10-CM

## 2025-05-26 DIAGNOSIS — E11.29 DIABETES MELLITUS WITH PROTEINURIA (HCC): ICD-10-CM

## 2025-05-26 RX ORDER — ROSUVASTATIN CALCIUM 20 MG/1
20 TABLET, COATED ORAL DAILY
Qty: 30 TABLET | Refills: 0 | Status: SHIPPED | OUTPATIENT
Start: 2025-05-26

## 2025-05-27 NOTE — TELEPHONE ENCOUNTER
Last time medication was refilled: 4/28/25  Next office visit due/scheduled: 6/6/25  Last office visit: 4/7/25  Last Labs: 5/8/25

## 2025-06-05 ENCOUNTER — LAB ENCOUNTER (OUTPATIENT)
Dept: LAB | Age: 60
End: 2025-06-05
Attending: INTERNAL MEDICINE

## 2025-06-05 DIAGNOSIS — E11.29 DIABETES MELLITUS WITH PROTEINURIA (HCC): ICD-10-CM

## 2025-06-05 DIAGNOSIS — R80.9 DIABETES MELLITUS WITH PROTEINURIA (HCC): ICD-10-CM

## 2025-06-05 DIAGNOSIS — I15.2 HYPERTENSION ASSOCIATED WITH DIABETES (HCC): ICD-10-CM

## 2025-06-05 DIAGNOSIS — E11.69 HYPERLIPIDEMIA ASSOCIATED WITH TYPE 2 DIABETES MELLITUS (HCC): ICD-10-CM

## 2025-06-05 DIAGNOSIS — E78.5 HYPERLIPIDEMIA ASSOCIATED WITH TYPE 2 DIABETES MELLITUS (HCC): ICD-10-CM

## 2025-06-05 DIAGNOSIS — E11.8 DIABETES MELLITUS WITH COMPLICATION (HCC): ICD-10-CM

## 2025-06-05 DIAGNOSIS — E78.1 HYPERTRIGLYCERIDEMIA: ICD-10-CM

## 2025-06-05 DIAGNOSIS — E11.59 HYPERTENSION ASSOCIATED WITH DIABETES (HCC): ICD-10-CM

## 2025-06-05 LAB
ALBUMIN SERPL-MCNC: 4.8 G/DL (ref 3.2–4.8)
ALBUMIN/GLOB SERPL: 1.8 {RATIO} (ref 1–2)
ALP LIVER SERPL-CCNC: 72 U/L (ref 46–118)
ALT SERPL-CCNC: 26 U/L (ref 10–49)
ANION GAP SERPL CALC-SCNC: 9 MMOL/L (ref 0–18)
AST SERPL-CCNC: 21 U/L (ref ?–34)
BILIRUB SERPL-MCNC: 0.4 MG/DL (ref 0.3–1.2)
BUN BLD-MCNC: 18 MG/DL (ref 9–23)
CALCIUM BLD-MCNC: 10.3 MG/DL (ref 8.7–10.6)
CHLORIDE SERPL-SCNC: 104 MMOL/L (ref 98–112)
CHOLEST SERPL-MCNC: 159 MG/DL (ref ?–200)
CO2 SERPL-SCNC: 29 MMOL/L (ref 21–32)
CREAT BLD-MCNC: 1.02 MG/DL (ref 0.55–1.02)
EGFRCR SERPLBLD CKD-EPI 2021: 63 ML/MIN/1.73M2 (ref 60–?)
EST. AVERAGE GLUCOSE BLD GHB EST-MCNC: 200 MG/DL (ref 68–126)
FASTING PATIENT LIPID ANSWER: YES
FASTING STATUS PATIENT QL REPORTED: YES
GLOBULIN PLAS-MCNC: 2.6 G/DL (ref 2–3.5)
GLUCOSE BLD-MCNC: 159 MG/DL (ref 70–99)
HBA1C MFR BLD: 8.6 % (ref ?–5.7)
HDLC SERPL-MCNC: 39 MG/DL (ref 40–59)
LDLC SERPL CALC-MCNC: 70 MG/DL (ref ?–100)
NONHDLC SERPL-MCNC: 120 MG/DL (ref ?–130)
OSMOLALITY SERPL CALC.SUM OF ELEC: 299 MOSM/KG (ref 275–295)
POTASSIUM SERPL-SCNC: 4.8 MMOL/L (ref 3.5–5.1)
PROT SERPL-MCNC: 7.4 G/DL (ref 5.7–8.2)
SODIUM SERPL-SCNC: 142 MMOL/L (ref 136–145)
TRIGL SERPL-MCNC: 317 MG/DL (ref 30–149)
VLDLC SERPL CALC-MCNC: 49 MG/DL (ref 0–30)

## 2025-06-05 PROCEDURE — 80061 LIPID PANEL: CPT | Performed by: INTERNAL MEDICINE

## 2025-06-05 PROCEDURE — 80053 COMPREHEN METABOLIC PANEL: CPT | Performed by: INTERNAL MEDICINE

## 2025-06-05 PROCEDURE — 83036 HEMOGLOBIN GLYCOSYLATED A1C: CPT | Performed by: INTERNAL MEDICINE

## 2025-06-06 ENCOUNTER — OFFICE VISIT (OUTPATIENT)
Dept: INTERNAL MEDICINE CLINIC | Facility: CLINIC | Age: 60
End: 2025-06-06

## 2025-06-06 VITALS
HEART RATE: 84 BPM | WEIGHT: 166 LBS | SYSTOLIC BLOOD PRESSURE: 132 MMHG | OXYGEN SATURATION: 99 % | TEMPERATURE: 98 F | DIASTOLIC BLOOD PRESSURE: 68 MMHG | BODY MASS INDEX: 35.81 KG/M2 | RESPIRATION RATE: 16 BRPM | HEIGHT: 57 IN

## 2025-06-06 DIAGNOSIS — Z12.11 COLON CANCER SCREENING: ICD-10-CM

## 2025-06-06 DIAGNOSIS — Z12.31 ENCOUNTER FOR SCREENING MAMMOGRAM FOR MALIGNANT NEOPLASM OF BREAST: ICD-10-CM

## 2025-06-06 DIAGNOSIS — I15.2 HYPERTENSION ASSOCIATED WITH DIABETES (HCC): ICD-10-CM

## 2025-06-06 DIAGNOSIS — E11.8 DIABETES MELLITUS WITH COMPLICATION (HCC): Primary | ICD-10-CM

## 2025-06-06 DIAGNOSIS — E11.59 HYPERTENSION ASSOCIATED WITH DIABETES (HCC): ICD-10-CM

## 2025-06-06 PROCEDURE — 99214 OFFICE O/P EST MOD 30 MIN: CPT | Performed by: INTERNAL MEDICINE

## 2025-06-06 RX ORDER — LOSARTAN POTASSIUM 50 MG/1
50 TABLET ORAL DAILY
Qty: 90 TABLET | Refills: 1 | Status: SHIPPED | OUTPATIENT
Start: 2025-06-06

## 2025-06-06 RX ORDER — ROSUVASTATIN CALCIUM 20 MG/1
20 TABLET, COATED ORAL DAILY
Qty: 90 TABLET | Refills: 3 | Status: SHIPPED | OUTPATIENT
Start: 2025-06-06

## 2025-06-06 RX ORDER — METFORMIN HYDROCHLORIDE 500 MG/1
500 TABLET, EXTENDED RELEASE ORAL 2 TIMES DAILY WITH MEALS
Qty: 180 TABLET | Refills: 1 | Status: SHIPPED | OUTPATIENT
Start: 2025-06-06

## 2025-06-06 RX ORDER — METOPROLOL SUCCINATE 25 MG/1
25 TABLET, EXTENDED RELEASE ORAL DAILY
Qty: 90 TABLET | Refills: 1 | Status: SHIPPED | OUTPATIENT
Start: 2025-06-06

## 2025-06-06 RX ORDER — DAPAGLIFLOZIN 10 MG/1
10 TABLET, FILM COATED ORAL DAILY
Qty: 90 TABLET | Refills: 1 | Status: SHIPPED | OUTPATIENT
Start: 2025-06-06

## 2025-06-06 RX ORDER — ROSUVASTATIN CALCIUM 20 MG/1
20 TABLET, COATED ORAL DAILY
Qty: 30 TABLET | Refills: 0 | OUTPATIENT
Start: 2025-06-06

## 2025-06-06 RX ORDER — GABAPENTIN 100 MG/1
100 CAPSULE ORAL 2 TIMES DAILY
Qty: 180 CAPSULE | Refills: 1 | Status: SHIPPED | OUTPATIENT
Start: 2025-06-06

## 2025-06-06 NOTE — PROGRESS NOTES
Subjective:   Patient ID: Trish Adam is a 60 year old female.    HPI  HPI:   Trish Adam is a 60 year old female who presents for recheck of her diabetes and HTN. Patient’s FBS have been improved. No hypoglycemia.  Pt has been checking her feet on a regular basis. Pt denies any tingling of the feet. Pt denies cp or sob.    Wt Readings from Last 6 Encounters:   06/06/25 166 lb (75.3 kg)   04/07/25 162 lb (73.5 kg)   02/24/25 164 lb (74.4 kg)   06/06/22 164 lb 3.2 oz (74.5 kg)   05/16/22 166 lb (75.3 kg)   11/09/21 166 lb (75.3 kg)     Body mass index is 35.92 kg/m².     Lab Results   Component Value Date    A1C 8.6 (H) 06/05/2025    A1C 11.8 (H) 02/25/2025    A1C 7.4 (H) 12/30/2020     Lab Results   Component Value Date    CHOLEST 159 06/05/2025    CHOLEST 219 (H) 02/25/2025    CHOLEST 202 (H) 12/30/2020     Lab Results   Component Value Date    HDL 39 (L) 06/05/2025    HDL 38 (L) 02/25/2025    HDL 44 12/30/2020     Lab Results   Component Value Date    LDL 70 06/05/2025     (H) 02/25/2025     (H) 12/30/2020     Lab Results   Component Value Date    TRIG 317 (H) 06/05/2025    TRIG 420 (H) 02/25/2025    TRIG 286 (H) 12/30/2020     Lab Results   Component Value Date    AST 21 06/05/2025    AST 25 02/25/2025    AST 18 12/30/2020     Lab Results   Component Value Date    ALT 26 06/05/2025    ALT 32 02/25/2025    ALT 24 12/30/2020     Malb/Cre Calc   Date Value Ref Range Status   05/08/2025 112.7 (H) <=30.0 ug/mg Final     Comment:     <30 ug/mg creatinine       Normal     ug/mg creatinine   Microalbuminuria   >300 ug/mg creatinine      Albuminuria       05/21/2020 61.3 (H) <=30.0 ug/mg Final     Comment:     <30 ug/mg creatinine       Normal     ug/mg creatinine   Microalbuminuria   >300 ug/mg creatinine      Albuminuria           Current Medications[1]   Past Medical History[2]   Past Surgical History[3]   Social History: Short Social Hx on File[4]  Exercise: minimal.  Diet:  watches minimally     REVIEW OF SYSTEMS:   GENERAL HEALTH: feels well otherwise  SKIN: denies any unusual skin lesions or rashes  RESPIRATORY: denies shortness of breath with exertion  CARDIOVASCULAR: denies chest pain on exertion  GI: denies abdominal pain and denies heartburn  NEURO: denies headaches    EXAM:   /68   Pulse 84   Temp 97.9 °F (36.6 °C)   Resp 16   Ht 4' 9\" (1.448 m)   Wt 166 lb (75.3 kg)   SpO2 99%   BMI 35.92 kg/m²   GENERAL: well developed, well nourished,in no apparent distress  SKIN: no rashes,no suspicious lesions  NECK: supple,no adenopathy,no bruits  LUNGS: clear to auscultation  CARDIO: RRR without murmur  GI: good BS's,no masses, HSM or tenderness  EXTREMITIES: no cyanosis, clubbing or edema  NEURO: Bilateral barefoot skin diabetic exam is normal, visualized feet and the appearance is normal.  Bilateral monofilament/sensation of both feet is normal.  Pulsation pedal pulse exam of both lower legs/feet is normal as well.        ASSESSMENT AND PLAN:   Trish Adam is a 60 year old female who presents for a recheck of her Diabetes mellitus with complication (HCC) which is improved buy remains uncontrolled and Hypertension associated with diabetes (HCC) which is under control    Pt declines starting semiglutide. Will consider in 3 m and wishes to try to control with diet  Recommendations are: continue present meds, check HgbA1C, fasting lipids and CMP, lose wgt with carbohydrate controlled diet and exercise,  check feet daily.  The patient indicates understanding of these issues and agrees to the plan.  The patient is asked to return in 3 m.    History/Other:   Review of Systems  Current Medications[5]  Allergies:Allergies[6]    Objective:   Physical Exam    Assessment & Plan:   1. Diabetes mellitus with complication (HCC)    2. Hypertension associated with diabetes (HCC)    3. Encounter for screening mammogram for malignant neoplasm of breast    4. Colon cancer screening         Orders Placed This Encounter   Procedures    Hemoglobin A1C    Occult Blood, Fecal, Immunoassay (Blue cards) [E]       Meds This Visit:  Requested Prescriptions     Signed Prescriptions Disp Refills    dapagliflozin (FARXIGA) 10 MG Oral Tab 90 tablet 1     Sig: Take 1 tablet (10 mg total) by mouth daily.    gabapentin 100 MG Oral Cap 180 capsule 1     Sig: Take 1 capsule (100 mg total) by mouth 2 (two) times daily.    losartan 50 MG Oral Tab 90 tablet 1     Sig: Take 1 tablet (50 mg total) by mouth daily.    metFORMIN  MG Oral Tablet 24 Hr 180 tablet 1     Sig: Take 1 tablet (500 mg total) by mouth 2 (two) times daily with meals. .    metoprolol succinate ER 25 MG Oral Tablet 24 Hr 90 tablet 1     Sig: Take 1 tablet (25 mg total) by mouth daily.    rosuvastatin 20 MG Oral Tab 90 tablet 3     Sig: Take 1 tablet (20 mg total) by mouth daily.       Imaging & Referrals:  [facility] KARIN 2D+3D SCREENING BILAT (CPT=77067/82447)         [1]   Current Outpatient Medications   Medication Sig Dispense Refill    dapagliflozin (FARXIGA) 10 MG Oral Tab Take 1 tablet (10 mg total) by mouth daily. 90 tablet 1    gabapentin 100 MG Oral Cap Take 1 capsule (100 mg total) by mouth 2 (two) times daily. 180 capsule 1    losartan 50 MG Oral Tab Take 1 tablet (50 mg total) by mouth daily. 90 tablet 1    metFORMIN  MG Oral Tablet 24 Hr Take 1 tablet (500 mg total) by mouth 2 (two) times daily with meals. . 180 tablet 1    metoprolol succinate ER 25 MG Oral Tablet 24 Hr Take 1 tablet (25 mg total) by mouth daily. 90 tablet 1    rosuvastatin 20 MG Oral Tab Take 1 tablet (20 mg total) by mouth daily. 90 tablet 3    Glucose Blood (CONTOUR NEXT TEST) In Vitro Strip Test blood sugar twice a day 200 each 1    Microlet Lancets Does not apply Misc Use to test blood sugar twice daily. 200 each 1    Cholecalciferol (VITAMIN D3) 25 MCG (1000 UT) Oral Cap Take 1 tablet by mouth daily.      aspirin 81 MG Oral Tab Take 1 tablet (81 mg total)  by mouth daily.      Vitamin C 500 MG Oral Tab Take 1 tablet (500 mg total) by mouth daily.     [2] History reviewed. No pertinent past medical history.  [3] History reviewed. No pertinent surgical history.  [4]   Social History  Socioeconomic History    Marital status: Single   Tobacco Use    Smoking status: Light Smoker     Types: Cigarettes    Smokeless tobacco: Never   Vaping Use    Vaping status: Never Used   Substance and Sexual Activity    Alcohol use: Yes     Comment: socially    Drug use: Never    Sexual activity: Yes   [5]   Current Outpatient Medications   Medication Sig Dispense Refill    dapagliflozin (FARXIGA) 10 MG Oral Tab Take 1 tablet (10 mg total) by mouth daily. 90 tablet 1    gabapentin 100 MG Oral Cap Take 1 capsule (100 mg total) by mouth 2 (two) times daily. 180 capsule 1    losartan 50 MG Oral Tab Take 1 tablet (50 mg total) by mouth daily. 90 tablet 1    metFORMIN  MG Oral Tablet 24 Hr Take 1 tablet (500 mg total) by mouth 2 (two) times daily with meals. . 180 tablet 1    metoprolol succinate ER 25 MG Oral Tablet 24 Hr Take 1 tablet (25 mg total) by mouth daily. 90 tablet 1    rosuvastatin 20 MG Oral Tab Take 1 tablet (20 mg total) by mouth daily. 90 tablet 3    Glucose Blood (CONTOUR NEXT TEST) In Vitro Strip Test blood sugar twice a day 200 each 1    Microlet Lancets Does not apply Misc Use to test blood sugar twice daily. 200 each 1    Cholecalciferol (VITAMIN D3) 25 MCG (1000 UT) Oral Cap Take 1 tablet by mouth daily.      aspirin 81 MG Oral Tab Take 1 tablet (81 mg total) by mouth daily.      Vitamin C 500 MG Oral Tab Take 1 tablet (500 mg total) by mouth daily.     [6] No Active Allergies

## 2025-07-06 DIAGNOSIS — E11.29 DIABETES MELLITUS WITH PROTEINURIA (HCC): ICD-10-CM

## 2025-07-06 DIAGNOSIS — R80.9 DIABETES MELLITUS WITH PROTEINURIA (HCC): ICD-10-CM

## 2025-07-07 RX ORDER — GLIMEPIRIDE 2 MG/1
4 TABLET ORAL 2 TIMES DAILY
Qty: 120 TABLET | Refills: 0 | OUTPATIENT
Start: 2025-07-07

## 2025-07-07 NOTE — TELEPHONE ENCOUNTER
GLIMEPIRIDE 2 MG Oral Tab     The original prescription was discontinued on 6/6/2025 by Agus Gatica MD for the following reason: Dose adjustment. Renewing this prescription may not be appropriate.

## 2025-08-17 DIAGNOSIS — R80.9 DIABETES MELLITUS WITH PROTEINURIA (HCC): ICD-10-CM

## 2025-08-17 DIAGNOSIS — E11.29 DIABETES MELLITUS WITH PROTEINURIA (HCC): ICD-10-CM

## 2025-08-17 RX ORDER — GLIMEPIRIDE 2 MG/1
4 TABLET ORAL 2 TIMES DAILY
Qty: 120 TABLET | Refills: 0 | Status: SHIPPED | OUTPATIENT
Start: 2025-08-17

## (undated) DIAGNOSIS — I10 ESSENTIAL HYPERTENSION: ICD-10-CM

## (undated) DIAGNOSIS — R80.9 DIABETES MELLITUS WITH PROTEINURIA (HCC): ICD-10-CM

## (undated) DIAGNOSIS — E11.29 DIABETES MELLITUS WITH PROTEINURIA (HCC): ICD-10-CM

## (undated) DIAGNOSIS — E78.1 HYPERTRIGLYCERIDEMIA: ICD-10-CM

## (undated) DIAGNOSIS — E03.4 HYPOTHYROIDISM DUE TO ACQUIRED ATROPHY OF THYROID: ICD-10-CM

## (undated) NOTE — LETTER
2020      Trish Adam  :  1965      Diabetic supplies including Glucometer, lancets and test strips per pt preference    Test blood sugar twice a day     Dispense 30 or 90 day supply per pt preference    DX:  Diabetes Type 2 E11.9